# Patient Record
Sex: FEMALE | Race: WHITE | NOT HISPANIC OR LATINO | Employment: OTHER | URBAN - METROPOLITAN AREA
[De-identification: names, ages, dates, MRNs, and addresses within clinical notes are randomized per-mention and may not be internally consistent; named-entity substitution may affect disease eponyms.]

---

## 2017-01-04 ENCOUNTER — PATIENT MESSAGE (OUTPATIENT)
Dept: FAMILY MEDICINE | Facility: CLINIC | Age: 77
End: 2017-01-04

## 2017-02-01 ENCOUNTER — OFFICE VISIT (OUTPATIENT)
Dept: FAMILY MEDICINE | Facility: CLINIC | Age: 77
End: 2017-02-01
Payer: MEDICARE

## 2017-02-01 VITALS
OXYGEN SATURATION: 98 % | WEIGHT: 139.75 LBS | HEART RATE: 88 BPM | DIASTOLIC BLOOD PRESSURE: 66 MMHG | RESPIRATION RATE: 18 BRPM | TEMPERATURE: 98 F | SYSTOLIC BLOOD PRESSURE: 112 MMHG | HEIGHT: 62 IN | BODY MASS INDEX: 25.72 KG/M2

## 2017-02-01 DIAGNOSIS — S76.012A HIP STRAIN, LEFT, INITIAL ENCOUNTER: Primary | ICD-10-CM

## 2017-02-01 DIAGNOSIS — C56.9 OVARIAN CANCER, UNSPECIFIED LATERALITY: ICD-10-CM

## 2017-02-01 PROCEDURE — 99214 OFFICE O/P EST MOD 30 MIN: CPT | Mod: S$PBB,,, | Performed by: FAMILY MEDICINE

## 2017-02-01 PROCEDURE — 99999 PR PBB SHADOW E&M-EST. PATIENT-LVL III: CPT | Mod: PBBFAC,,, | Performed by: FAMILY MEDICINE

## 2017-02-01 PROCEDURE — 99213 OFFICE O/P EST LOW 20 MIN: CPT | Mod: PBBFAC,PO | Performed by: FAMILY MEDICINE

## 2017-02-01 RX ORDER — ATORVASTATIN CALCIUM 40 MG/1
TABLET, FILM COATED ORAL
COMMUNITY
Start: 2016-12-09 | End: 2017-02-01

## 2017-02-01 RX ORDER — SERTRALINE HYDROCHLORIDE 50 MG/1
TABLET, FILM COATED ORAL
COMMUNITY
Start: 2016-12-11 | End: 2017-06-15
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
## 2017-02-01 NOTE — MR AVS SNAPSHOT
[facility]  [address]  [place] [address]  Phone: [phone]  Fax: [phone]                  Leena Christina   2017 11:20 AM   Office Visit    Description:  Female : 1940   Provider:  Linda Leonard MD   Department:  [facility]           Reason for Visit     Hip Pain     Leg Pain           Diagnoses this Visit        Comments    Hip strain, left, initial encounter    -  Primary     Ovarian cancer, unspecified laterality                To Do List           Goals (5 Years of Data)     None      Follow-Up and Disposition     Return in about 2 weeks (around 2/15/2017) for If not better.      [facility] On Call     [facility] On Call Nurse Care Line -  Assistance  Registered nurses in the [facility] On Call Center provide clinical advisement, health education, appointment booking, and other advisory services.  Call for this free service at [phone].             Medications           Message regarding Medications     Verify the changes and/or additions to your medication regime listed below are the same as discussed with your clinician today.  If any of these changes or additions are incorrect, please notify your healthcare provider.        STOP taking these medications     atorvastatin (LIPITOR) 40 MG tablet     hydrocortisone-pramoxine (ANALPRAM-HC) 2.5-1 % Crea            Verify that the below list of medications is an accurate representation of the medications you are currently taking.  If none reported, the list may be blank. If incorrect, please contact your healthcare provider. Carry this list with you in case of emergency.           Current Medications     aspirin (ECOTRIN) 81 MG EC tablet Take 81 mg by mouth once daily.      B INFANTIS/B ANI/B YARELY/B BIFID (PROBIOTIC 4X ORAL) Take by mouth.    blood sugar diagnostic Strp TEST 1 TIMES DAILY    candesartan (ATACAND) 16 MG tablet Take 1 tablet (16 mg total) by mouth once daily.    celecoxib (CELEBREX)  "200 MG capsule TAKE 1 CAPSULE EVERY OTHER DAY    chlorhexidine (PERIDEX) 0.12 % solution RINSE WITH 15 MLS ONCE IN THE MORNING AND ONCE IN THE EVENING    ferrous sulfate (IRON) 325 mg (65 mg iron) Tab Take 325 mg by mouth daily with breakfast.      fish oil-omega-3 fatty acids 300-1,000 mg capsule Take 2 g by mouth once daily.      glucosamine-chondroitin 500-400 mg tablet Take 1 tablet by mouth 2 (two) times daily.      hydrochlorothiazide (HYDRODIURIL) 25 MG tablet TAKE 1 TABLET DAILY    L-METHYL-B6-B12 3-35-2 mg Tab TAKE 1 TABLET DAILY    metformin (GLUCOPHAGE) 500 MG tablet TAKE 1 TABLET TWICE A DAY WITH MEALS    NEXIUM 40 mg capsule TAKE 1 CAPSULE ONCE DAILY    ramipril (ALTACE) 5 MG capsule TAKE 1 CAPSULE DAILY    rosuvastatin (CRESTOR) 10 MG tablet Take 10 mg by mouth once daily.    sertraline (ZOLOFT) 100 MG tablet Take 150 mg by mouth once daily.     sertraline (ZOLOFT) 50 MG tablet     valsartan (DIOVAN) 160 MG tablet Take 1 tablet (160 mg total) by mouth once daily.    zolpidem (AMBIEN) 5 MG Tab 1-2 HS prn    FLUAD 0250-8577, 65 YR UP,,PF, 45 mcg (15 mcg x 3)/0.5 mL Syrg ADM 0.5ML IM UTD           Clinical Reference Information           Vital Signs - Last Recorded  Most recent update: 2/1/2017 11:27 AM by Tatiana Salazar MA    BP Pulse Temp Resp Ht Wt    112/66 (BP Location: Left arm, Patient Position: Sitting, BP Method: Manual) 88 97.8 °F (36.6 °C) (Oral) 18 5' 2" (1.575 m) 63.4 kg (139 lb 12.4 oz)    SpO2 BMI             98% 25.56 kg/m2         Blood Pressure          Most Recent Value    BP  112/66      Allergies as of 2/1/2017     Dilaudid [Hydromorphone]      Immunizations Administered on Date of Encounter - 2/1/2017     None      "

## 2017-02-16 ENCOUNTER — OFFICE VISIT (OUTPATIENT)
Dept: FAMILY MEDICINE | Facility: CLINIC | Age: 77
End: 2017-02-16
Payer: MEDICARE

## 2017-02-16 ENCOUNTER — HOSPITAL ENCOUNTER (OUTPATIENT)
Dept: RADIOLOGY | Facility: HOSPITAL | Age: 77
Discharge: HOME | End: 2017-02-16
Attending: NURSE PRACTITIONER
Payer: MEDICARE

## 2017-02-16 VITALS
SYSTOLIC BLOOD PRESSURE: 116 MMHG | HEART RATE: 96 BPM | DIASTOLIC BLOOD PRESSURE: 68 MMHG | BODY MASS INDEX: 24.84 KG/M2 | HEIGHT: 63 IN | WEIGHT: 140.19 LBS | OXYGEN SATURATION: 97 % | TEMPERATURE: 98 F

## 2017-02-16 DIAGNOSIS — M25.552 LEFT HIP PAIN: ICD-10-CM

## 2017-02-16 DIAGNOSIS — M25.552 LEFT HIP PAIN: Primary | ICD-10-CM

## 2017-02-16 PROCEDURE — 99214 OFFICE O/P EST MOD 30 MIN: CPT | Mod: S$PBB,,, | Performed by: NURSE PRACTITIONER

## 2017-02-16 PROCEDURE — 73502 X-RAY EXAM HIP UNI 2-3 VIEWS: CPT | Mod: TC,PO,LT

## 2017-02-16 PROCEDURE — 99999 PR PBB SHADOW E&M-EST. PATIENT-LVL V: CPT | Mod: PBBFAC,,, | Performed by: NURSE PRACTITIONER

## 2017-02-16 PROCEDURE — 73502 X-RAY EXAM HIP UNI 2-3 VIEWS: CPT | Mod: 26,LT,, | Performed by: RADIOLOGY

## 2017-02-16 RX ORDER — ATORVASTATIN CALCIUM 40 MG/1
40 TABLET, FILM COATED ORAL DAILY
COMMUNITY
End: 2017-03-17

## 2017-02-16 NOTE — MR AVS SNAPSHOT
[facility]  [address]  [place] [address]  Phone: [phone]  Fax: [phone]                  Leena Christina   2017 10:00 AM   Office Visit    Description:  Female : 1940   Provider:  Ed Griffin NP   Department:  [facility]           Reason for Visit     Hip Pain           Diagnoses this Visit        Comments    Left hip pain    -  Primary            To Do List           Goals (5 Years of Data)     None      Follow-Up and Disposition     Return in about 2 weeks (around 3/2/2017).      [facility] On Call     [facility] On Call Nurse Care Line -  Assistance  Registered nurses in the [facility] On Call Center provide clinical advisement, health education, appointment booking, and other advisory services.  Call for this free service at [phone].             Medications           Message regarding Medications     Verify the changes and/or additions to your medication regime listed below are the same as discussed with your clinician today.  If any of these changes or additions are incorrect, please notify your healthcare provider.        STOP taking these medications     rosuvastatin (CRESTOR) 10 MG tablet Take 10 mg by mouth once daily.    candesartan (ATACAND) 16 MG tablet Take 1 tablet (16 mg total) by mouth once daily.    chlorhexidine (PERIDEX) 0.12 % solution RINSE WITH 15 MLS ONCE IN THE MORNING AND ONCE IN THE EVENING    L-METHYL-B6-B12 3-35-2 mg Tab TAKE 1 TABLET DAILY           Verify that the below list of medications is an accurate representation of the medications you are currently taking.  If none reported, the list may be blank. If incorrect, please contact your healthcare provider. Carry this list with you in case of emergency.           Current Medications     aspirin (ECOTRIN) 81 MG EC tablet Take 81 mg by mouth once daily.      atorvastatin (LIPITOR) 40 MG tablet Take 40 mg by mouth once daily.    B INFANTIS/B ANI/B YARELY/B BIFID  "(PROBIOTIC 4X ORAL) Take by mouth.    blood sugar diagnostic Strp TEST 1 TIMES DAILY    celecoxib (CELEBREX) 200 MG capsule TAKE 1 CAPSULE EVERY OTHER DAY    ferrous sulfate (IRON) 325 mg (65 mg iron) Tab Take 325 mg by mouth daily with breakfast.      fish oil-omega-3 fatty acids 300-1,000 mg capsule Take 2 g by mouth once daily.      FLUAD 5643-4169, 65 YR UP,,PF, 45 mcg (15 mcg x 3)/0.5 mL Syrg ADM 0.5ML IM UTD    glucosamine-chondroitin 500-400 mg tablet Take 1 tablet by mouth 2 (two) times daily.      hydrochlorothiazide (HYDRODIURIL) 25 MG tablet TAKE 1 TABLET DAILY    metformin (GLUCOPHAGE) 500 MG tablet TAKE 1 TABLET TWICE A DAY WITH MEALS    NEXIUM 40 mg capsule TAKE 1 CAPSULE ONCE DAILY    ramipril (ALTACE) 5 MG capsule TAKE 1 CAPSULE DAILY    sertraline (ZOLOFT) 100 MG tablet Take 150 mg by mouth once daily.     sertraline (ZOLOFT) 50 MG tablet     valsartan (DIOVAN) 160 MG tablet Take 1 tablet (160 mg total) by mouth once daily.    zolpidem (AMBIEN) 5 MG Tab 1-2 HS prn           Clinical Reference Information           Your Vitals Were     BP Pulse Temp Height Weight SpO2    116/68 (BP Location: Right arm, Patient Position: Sitting, BP Method: Manual) 96 97.9 °F (36.6 °C) (Oral) 5' 3" (1.6 m) 63.6 kg (140 lb 3.4 oz) 97%    BMI                24.84 kg/m2          Blood Pressure          Most Recent Value    BP  116/68      Allergies as of 2/16/2017     Dilaudid [Hydromorphone]      Immunizations Administered on Date of Encounter - 2/16/2017     None      Orders Placed During Today's Visit      Normal Orders This Visit    Ambulatory Referral to Physical/Occupational Therapy     Future Labs/Procedures Expected by Expires    X-Ray Hip 2 or 3 views Right  2/16/2017 2/17/2018      Instructions    Celebrex daily for 2 weeks.        Language Assistance Services     ATTENTION: Language assistance services are available, free of charge. Please call [phone].      ATENCIÓN: Si nisreen onofre " disposición servicios gratuitos de asistencia lingüística. Radha al [phone].     DEVIN Ý: N?u b?n nói Ti?ng Vi?t, có các d?ch v? h? tr? ngôn ng? mi?n phí dành cho b?n. G?i s? [phone].         [facility] complies with applicable Federal civil rights laws and does not discriminate on the basis of race, color, national origin, age, disability, or sex.

## 2017-02-16 NOTE — PROGRESS NOTES
This dictation has been generated using Dragon Dictation some phonetic errors may occur.     Leena was seen today for hip pain.    Diagnoses and all orders for this visit:    Left hip pain  -     Ambulatory Referral to Physical/Occupational Therapy  -     Cancel: X-Ray Hip 2 or 3 views Right; Future  -     X-Ray Hip 2 or 3 views Left; Future      Left hip pain.  Suspect some arthritic issues.  Also consider tendinitis and bursitis.  Check x-ray as above and rule out avascular necrosis.      Return in about 2 weeks (around 3/2/2017).      ________________________________________________________________  ________________________________________________________________        Chief Complaint   Patient presents with    Hip Pain     History of present illness  This 76 y.o. presents today for complaint of left hip pain.  Patient saw one of my partners on February 1.  Symptoms started the day before.  Patient indicates she takes a fitness 101 program which is some sort of aerobics.  She indicates movement of the hip and the lateral and horizontal plane while standing.  She indicates that the kicking they have flared up the issue.  She did not mention this at last visit.  She denies a history of trauma.  Patient has also been doing water aerobics prior to onset of hip pain.  In the last 2 weeks she has not attended either class.  She indicates that it is a little better but not very much.  Patient states that left hip pain is worse in a.m.  When she gets up to go the bathroom at night she experiences pain and after sitting for periods of time she experiences pain with ambulation.  Patient does have pain with weightbearing.  Previously diagnosed with a hip sprain.  Patient denies any area of focal tenderness.  No redness or swelling in the affected joint.  Review of systems  Patient denies a rash  Patient denies new low back pain.  Hip pain does go down the leg beyond the knee.    Past medical and social history  reviewed.    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    Social History     Social History    Marital status:      Spouse name: N/A    Number of children: N/A    Years of education: N/A     Social History Main Topics    Smoking status: Never Smoker    Smokeless tobacco: None    Alcohol use None    Drug use: None    Sexual activity: Not Asked     Other Topics Concern    None     Social History Narrative       Current Outpatient Prescriptions   Medication Sig Dispense Refill    aspirin (ECOTRIN) 81 MG EC tablet Take 81 mg by mouth once daily.        atorvastatin (LIPITOR) 40 MG tablet Take 40 mg by mouth once daily.      B INFANTIS/B ANI/B YARELY/B BIFID (PROBIOTIC 4X ORAL) Take by mouth.      blood sugar diagnostic Strp TEST 1 TIMES DAILY 100 strip 12    celecoxib (CELEBREX) 200 MG capsule TAKE 1 CAPSULE EVERY OTHER DAY 45 capsule 4    ferrous sulfate (IRON) 325 mg (65 mg iron) Tab Take 325 mg by mouth daily with breakfast.        fish oil-omega-3 fatty acids 300-1,000 mg capsule Take 2 g by mouth once daily.        FLUAD 9972-0938, 65 YR UP,,PF, 45 mcg (15 mcg x 3)/0.5 mL Syrg ADM 0.5ML IM UTD  0    glucosamine-chondroitin 500-400 mg tablet Take 1 tablet by mouth 2 (two) times daily.        hydrochlorothiazide (HYDRODIURIL) 25 MG tablet TAKE 1 TABLET DAILY 90 tablet 1    metformin (GLUCOPHAGE) 500 MG tablet TAKE 1 TABLET TWICE A DAY WITH MEALS 180 tablet 1    NEXIUM 40 mg capsule TAKE 1 CAPSULE ONCE DAILY (Patient taking differently: i tab q other day) 90 capsule 11     ramipril (ALTACE) 5 MG capsule TAKE 1 CAPSULE DAILY 90 capsule 12    sertraline (ZOLOFT) 100 MG tablet Take 150 mg by mouth once daily.       sertraline (ZOLOFT) 50 MG tablet       valsartan (DIOVAN) 160 MG tablet Take 1 tablet (160 mg total) by mouth once daily. 90 tablet 12    zolpidem (AMBIEN) 5 MG Tab 1-2 HS prn 45 tablet 5     No current facility-administered medications for this visit.        Review of patient's allergies indicates:   Allergen Reactions    Dilaudid [hydromorphone] Other (See Comments)     hallucinations       Physical examination  Vitals Reviewed  Gen. Well-dressed well-nourished in mild distress due to left hip pain.  Skin warm dry and intact.  No rashes noted.  Chest.  Respirations are even unlabored.    Neuro. Awake alert oriented x4.  Normal judgment and cognition noted.  Extremities no clubbing cyanosis or edema noted.  Left hip pain with internal rotation.  No pain with external rotation.  No pain with cross leg examination or movement in the lateral plane.  Straight leg raise is negative.  No crepitus noted during range of motion.  Fairly normal spinal alignment with minimal heel discrepancy noted on the left.  No tenderness with palpation over the hip joint.  No evidence of trochanteric bursitis noted during palpation.  No localized warmth during palpation.  Inspection does not reveal rash.    Call or return to clinic prn if these symptoms worsen or fail to improve as anticipated.

## 2017-02-26 RX ORDER — RAMIPRIL 5 MG/1
CAPSULE ORAL
Qty: 90 CAPSULE | Refills: 11 | Status: SHIPPED | OUTPATIENT
Start: 2017-02-26 | End: 2018-03-13 | Stop reason: SDUPTHER

## 2017-02-26 RX ORDER — HYDROCHLOROTHIAZIDE 25 MG/1
TABLET ORAL
Qty: 90 TABLET | Refills: 0 | Status: SHIPPED | OUTPATIENT
Start: 2017-02-26 | End: 2017-05-26 | Stop reason: SDUPTHER

## 2017-03-02 ENCOUNTER — CLINICAL SUPPORT (OUTPATIENT)
Dept: REHABILITATION | Facility: HOSPITAL | Age: 77
End: 2017-03-02
Attending: NURSE PRACTITIONER
Payer: MEDICARE

## 2017-03-02 DIAGNOSIS — M25.552 LEFT HIP PAIN: ICD-10-CM

## 2017-03-02 DIAGNOSIS — M25.60 JOINT STIFFNESS: Primary | ICD-10-CM

## 2017-03-02 DIAGNOSIS — M62.81 MUSCLE WEAKNESS: ICD-10-CM

## 2017-03-02 DIAGNOSIS — R26.2 DIFFICULTY WALKING: ICD-10-CM

## 2017-03-02 PROCEDURE — G8978 MOBILITY CURRENT STATUS: HCPCS | Mod: CK,PN

## 2017-03-02 PROCEDURE — 97161 PT EVAL LOW COMPLEX 20 MIN: CPT | Mod: PN

## 2017-03-02 PROCEDURE — G8979 MOBILITY GOAL STATUS: HCPCS | Mod: CJ,PN

## 2017-03-02 PROCEDURE — 97110 THERAPEUTIC EXERCISES: CPT | Mod: PN
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
## 2017-03-03 RX ORDER — CELECOXIB 200 MG/1
CAPSULE ORAL
Qty: 45 CAPSULE | Refills: 4 | Status: SHIPPED | OUTPATIENT
Start: 2017-03-03 | End: 2017-03-09 | Stop reason: SDUPTHER

## 2017-03-06 ENCOUNTER — PATIENT MESSAGE (OUTPATIENT)
Dept: FAMILY MEDICINE | Facility: CLINIC | Age: 77
End: 2017-03-06

## 2017-03-07 RX ORDER — ATORVASTATIN CALCIUM 40 MG/1
TABLET, FILM COATED ORAL
Qty: 90 TABLET | Refills: 2 | Status: SHIPPED | OUTPATIENT
Start: 2017-03-07 | End: 2017-03-17

## 2017-03-09 ENCOUNTER — CLINICAL SUPPORT (OUTPATIENT)
Dept: REHABILITATION | Facility: HOSPITAL | Age: 77
End: 2017-03-09
Attending: NURSE PRACTITIONER
Payer: MEDICARE

## 2017-03-09 DIAGNOSIS — M25.552 LEFT HIP PAIN: ICD-10-CM

## 2017-03-09 DIAGNOSIS — M62.81 MUSCLE WEAKNESS: ICD-10-CM

## 2017-03-09 DIAGNOSIS — M25.60 JOINT STIFFNESS: Primary | ICD-10-CM

## 2017-03-09 DIAGNOSIS — R26.2 DIFFICULTY WALKING: ICD-10-CM

## 2017-03-09 PROCEDURE — 97110 THERAPEUTIC EXERCISES: CPT | Mod: PN

## 2017-03-09 RX ORDER — CELECOXIB 200 MG/1
CAPSULE ORAL
Qty: 45 CAPSULE | Refills: 4 | Status: SHIPPED | OUTPATIENT
Start: 2017-03-09 | End: 2017-03-17 | Stop reason: SDUPTHER

## 2017-03-09 NOTE — PROGRESS NOTES
Name: Leena Christina  Clinic Number: [id number]  Date of Treatment: 03/09/2017   Diagnosis:   Encounter Diagnoses   Name Primary?    Joint stiffness Yes    Muscle weakness     Difficulty walking     Left hip pain        Time in: 1:30  Time Out: 2:10    Total Treatment Time: 40 mintues        Subjective:    Leena reports improvement of symptoms.  Patient reports their pain to be 2/10 on a 0-10 scale with 0 being no pain and 10 being the worst pain imaginable. States she would like to shorten treatment today due to fatigue.     Objective      Treatment:   Pt received therapeutic exercises to develop strength, endurance, ROM, flexibility, posture and core stabilization for 35  minutes including:    -Nu Step x 6 min  -hip fall outs x 20  -piriformis stretch 20 sec x 4  -pelvic tilts x 20  -TrA recruitment x 5 sec 2 x 10  -ball squeeze x 2 x 10  -heel raises 3 x 10  -gastroc stretch 30 sec x 4      Pt received manual therapy to improve mobility for 5 minutes: np  -MET left innominate posterior      Pt was instructed in and given a home exercise program consisting of the above activities.       Assessment     No c/o increased discomfort with prescribed activities. Improved pelvic mobility. Pt demonstrates a good understanding of the education provided and a good return demonstration of activities. Pt  Requires skilled supervision to complete and progress home program.    Medical necessity is demonstrated by the following IMPAIRMENTS:  -pain   -decreased range of motion/flexibility   -decreased muscle strength   -impaired function    -decreased ADL ability  -decreased recreational ability     Patient is making good progress towards established goals.      Rehab Potential: good       Short Term Goals (4 Weeks):     1.Pt to increase strength by a 1/2 grade of muscles test to allow for improvement in functional activities such as performing chores.  2.Pt to improve range of motion by 25% to allow for improved functional  mobility to allow for improvement in IADLs.   3.Pt to report compliance with HEP and demonstrate proper exercise technique to PT to show competence with self management of condition.  4.Decrease pain by 25% during functional activities.    Long Term Goals (12 Weeks):     1. Increase ROM to allow improved joint biomechanics during functional activities.   2.Increase trunk and lower extremity strength to within normal limits during functional activities.   3. Independent with home exercise program.   4. Full return to functional activities with manageable complaints.  5. Patient to demonstrate improved posture and body mechanics.  6. Decrease pain by 75% during functional activities.    CMS Impairment/Limitation/Restriction for FOTO Hip Survey  Status Limitation G-Code CMS Severity Modifier  Intake 44% 56% Current Status CK - At least 40 percent but less than 60 percent  Predicted 56% 44% Goal Status+ CK - At least 40 percent but less than 60 percent  Goal: 20-40% impaired.     Plan     Continue with established Plan of Care towards PT goals.     Certification Period: 3/2/17 to 6/2/17    Recommended Treatment Plan: 2-3 times per week for 12 weeks with treatments to consist of:  Neuromuscular and postural re-education,  training, therapeutic exercise, therapeutic activities,balance training, manual therapy, soft tissue mobilization, ROM exercises, Cardiovascular, Postural stabilization, manual traction, spinal mobilization, moist heat, cryotherapy, electrical stimulation, ultrasound, home exercise education and planning.      Therapist: Brooks Jiang, PT

## 2017-03-15 ENCOUNTER — CLINICAL SUPPORT (OUTPATIENT)
Dept: REHABILITATION | Facility: HOSPITAL | Age: 77
End: 2017-03-15
Attending: NURSE PRACTITIONER
Payer: MEDICARE

## 2017-03-15 DIAGNOSIS — M25.60 JOINT STIFFNESS: Primary | ICD-10-CM

## 2017-03-15 DIAGNOSIS — R26.2 DIFFICULTY WALKING: ICD-10-CM

## 2017-03-15 DIAGNOSIS — M62.81 MUSCLE WEAKNESS: ICD-10-CM

## 2017-03-15 DIAGNOSIS — M25.552 LEFT HIP PAIN: ICD-10-CM

## 2017-03-15 PROCEDURE — 97110 THERAPEUTIC EXERCISES: CPT | Mod: PN

## 2017-03-15 NOTE — PROGRESS NOTES
Name: Leena Christina  Clinic Number: [id number]  Date of Treatment: 03/15/2017   Diagnosis:   Encounter Diagnoses   Name Primary?    Joint stiffness Yes    Muscle weakness     Difficulty walking     Left hip pain        Time in: 10:00  Time Out: 11:00    Total Treatment Time: 60 mintues (1 on 1 with PT for 45 minutes)        Subjective:    Pt reports decreased pain today. Reports fair compliance with home program    Objective      Treatment:   Pt received therapeutic exercises to develop strength, endurance, ROM, flexibility, posture and core stabilization for 50  minutes including:    -Nu Step x 8 min  -hip fall outs x 20  -piriformis stretch 20 sec x 4  -pelvic tilts x 20  -TrA recruitment x 5 sec 2 x 10  -TrA recruitment with hip flexion 2 x 10  -ball squeeze x 2 x 10  -supine hip abd with green t-band 3 x 10  -heel raises 3 x 10  -gastroc stretch 30 sec x 4  -Hip flexion isometric with t-ball 2 x 10      Pt received manual therapy to improve mobility for 5 minutes: np  -MET left innominate posterior      Pt was instructed in and given a home exercise program consisting of the above activities.       Assessment     No c/o increased discomfort with prescribed activities. Good response to exercise progression.  Pt demonstrates a good understanding of the education provided and a good return demonstration of activities. Pt  Requires skilled supervision to complete and progress home program.    Medical necessity is demonstrated by the following IMPAIRMENTS:  -pain   -decreased range of motion/flexibility   -decreased muscle strength   -impaired function    -decreased ADL ability  -decreased recreational ability     Patient is making good progress towards established goals.      Rehab Potential: good       Short Term Goals (4 Weeks):     1.Pt to increase strength by a 1/2 grade of muscles test to allow for improvement in functional activities such as performing chores.  2.Pt to improve range of motion by 25% to  allow for improved functional mobility to allow for improvement in IADLs.   3.Pt to report compliance with HEP and demonstrate proper exercise technique to PT to show competence with self management of condition.  4.Decrease pain by 25% during functional activities.    Long Term Goals (12 Weeks):     1. Increase ROM to allow improved joint biomechanics during functional activities.   2.Increase trunk and lower extremity strength to within normal limits during functional activities.   3. Independent with home exercise program.   4. Full return to functional activities with manageable complaints.  5. Patient to demonstrate improved posture and body mechanics.  6. Decrease pain by 75% during functional activities.    CMS Impairment/Limitation/Restriction for FOTO Hip Survey  Status Limitation G-Code CMS Severity Modifier  Intake 44% 56% Current Status CK - At least 40 percent but less than 60 percent  Predicted 56% 44% Goal Status+ CK - At least 40 percent but less than 60 percent  Goal: 20-40% impaired.     Plan     Continue with established Plan of Care towards PT goals.     Certification Period: 3/2/17 to 6/2/17    Recommended Treatment Plan: 2-3 times per week for 12 weeks with treatments to consist of:  Neuromuscular and postural re-education,  training, therapeutic exercise, therapeutic activities,balance training, manual therapy, soft tissue mobilization, ROM exercises, Cardiovascular, Postural stabilization, manual traction, spinal mobilization, moist heat, cryotherapy, electrical stimulation, ultrasound, home exercise education and planning.      Therapist: Brooks Jiang, PT

## 2017-03-17 ENCOUNTER — CLINICAL SUPPORT (OUTPATIENT)
Dept: OPHTHALMOLOGY | Facility: CLINIC | Age: 77
End: 2017-03-17
Attending: NURSE PRACTITIONER
Payer: MEDICARE

## 2017-03-17 ENCOUNTER — OFFICE VISIT (OUTPATIENT)
Dept: FAMILY MEDICINE | Facility: CLINIC | Age: 77
End: 2017-03-17
Payer: MEDICARE

## 2017-03-17 VITALS
SYSTOLIC BLOOD PRESSURE: 128 MMHG | OXYGEN SATURATION: 95 % | HEIGHT: 63 IN | BODY MASS INDEX: 24.42 KG/M2 | WEIGHT: 137.81 LBS | DIASTOLIC BLOOD PRESSURE: 70 MMHG | TEMPERATURE: 98 F | HEART RATE: 93 BPM

## 2017-03-17 DIAGNOSIS — M25.552 LEFT HIP PAIN: ICD-10-CM

## 2017-03-17 DIAGNOSIS — E11.9 TYPE 2 DIABETES MELLITUS WITHOUT COMPLICATION, WITHOUT LONG-TERM CURRENT USE OF INSULIN: ICD-10-CM

## 2017-03-17 DIAGNOSIS — E11.9 TYPE 2 DIABETES MELLITUS WITHOUT COMPLICATION, WITHOUT LONG-TERM CURRENT USE OF INSULIN: Primary | ICD-10-CM

## 2017-03-17 PROCEDURE — 99214 OFFICE O/P EST MOD 30 MIN: CPT | Mod: S$PBB,,, | Performed by: NURSE PRACTITIONER

## 2017-03-17 PROCEDURE — 99999 PR PBB SHADOW E&M-EST. PATIENT-LVL IV: CPT | Mod: PBBFAC,,, | Performed by: NURSE PRACTITIONER

## 2017-03-17 PROCEDURE — 99999 PR PBB SHADOW E&M-EST. PATIENT-LVL II: CPT | Mod: PBBFAC,,,

## 2017-03-17 PROCEDURE — 99212 OFFICE O/P EST SF 10 MIN: CPT | Mod: PBBFAC,27,PO

## 2017-03-17 PROCEDURE — 92227 IMG RTA DETCJ/MNTR DS STAFF: CPT | Mod: 50,PBBFAC,PO

## 2017-03-17 RX ORDER — ROSUVASTATIN CALCIUM 10 MG/1
10 TABLET, COATED ORAL DAILY
COMMUNITY

## 2017-03-17 RX ORDER — CELECOXIB 200 MG/1
CAPSULE ORAL
Qty: 90 CAPSULE | Refills: 3 | Status: SHIPPED | OUTPATIENT
Start: 2017-03-17 | End: 2017-04-05 | Stop reason: SDUPTHER

## 2017-03-17 NOTE — PROGRESS NOTES
This dictation has been generated using Dragon Dictation some phonetic errors may occur.     Leena was seen today for leg pain and hip pain.    Diagnoses and all orders for this visit:    Type 2 diabetes mellitus without complication, without long-term current use of insulin  -     Hemoglobin A1c; Future  -     Basic metabolic panel; Future  -     Diabetic Eye Screening Photo; Future    Left hip pain    Other orders  -     celecoxib (CELEBREX) 200 MG capsule; TAKE 1 CAPSULE EVERY OTHER DAY      Diabetes. Due for lab.  I'll review and address accordingly.  left hip pain improved.  Continue with physical therapy.  Recommended resuming aquatic exercises.  May try other exercises after discussing with physical therapy.    Refill Celebrex    Return in about 5 months (around 8/17/2017).      ________________________________________________________________  ________________________________________________________________        Chief Complaint   Patient presents with    Leg Pain    Hip Pain     History of present illness  This 76 y.o. presents today for complaint of left hip pain.  Patient has been going to physical therapy once a week.  I saw this patient recently for this complaint.  She notes improvement since our last visit.  She is interested in resuming water aerobics.  We discussed resuming that for 2 weeks before going to [employer].  She also needs to discuss this with physical therapy.  Patient has a diabetes and renal insufficiency.  She is due for update of labs.  He has been more than 6 months.  Denies polyuria polydipsia.  Taking meds as directed.  No side effects to meds.  Had been active however due to hip issue has been less active since January.  Tries to watch her diet but does note some dietary indiscretion in regards to diabetic restrictions.  Review of systems  No fever or chills.  No malaise or body aches.  No unintended weight change.  No polyuria polydipsia  No chest pain or shortness of  breath  No nausea vomiting or diarrhea.  Appetite is fair.  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 No pertinent family history.    Social History     Social History    Marital status:      Spouse name: N/A    Number of children: N/A    Years of education: N/A     Social History Main Topics    Smoking status: Never Smoker    Smokeless tobacco: None    Alcohol use None    Drug use: None    Sexual activity: Not Asked     Other Topics Concern    None     Social History Narrative       Current Outpatient Prescriptions   Medication Sig Dispense Refill    aspirin (ECOTRIN) 81 MG EC tablet Take 81 mg by mouth once daily.        B INFANTIS/B ANI/B YARELY/B BIFID (PROBIOTIC 4X ORAL) Take by mouth.      blood sugar diagnostic Strp TEST 1 TIMES DAILY 100 strip 12    celecoxib (CELEBREX) 200 MG capsule TAKE 1 CAPSULE EVERY OTHER DAY 90 capsule 3    fish oil-omega-3 fatty acids 300-1,000 mg capsule Take 2 g by mouth once daily.        glucosamine-chondroitin 500-400 mg tablet Take 1 tablet by mouth 2 (two) times daily.        hydrochlorothiazide (HYDRODIURIL) 25 MG tablet TAKE 1 TABLET DAILY 90 tablet 0    metformin (GLUCOPHAGE) 500 MG tablet TAKE 1 TABLET TWICE A DAY WITH MEALS 180 tablet 1    NEXIUM 40 mg capsule TAKE 1 CAPSULE ONCE DAILY (Patient taking differently: i tab q other day) 90 capsule 11    ramipril (ALTACE) 5 MG capsule TAKE 1 CAPSULE DAILY 90 capsule 11    rosuvastatin (CRESTOR) 10 MG tablet Take 10 mg by mouth once  daily.      sertraline (ZOLOFT) 100 MG tablet Take 150 mg by mouth once daily.       sertraline (ZOLOFT) 50 MG tablet       valsartan (DIOVAN) 160 MG tablet Take 1 tablet (160 mg total) by mouth once daily. 90 tablet 12    zolpidem (AMBIEN) 5 MG Tab 1-2 HS prn 45 tablet 5     No current facility-administered medications for this visit.        Review of patient's allergies indicates:   Allergen Reactions    Dilaudid [hydromorphone] Other (See Comments)     hallucinations       Physical examination  Vitals Reviewed  Gen. Well-dressed well-nourished no apparent distress  Skin warm dry and intact.  No rashes noted.  Neck is supple without adenopathy  Chest.  Respirations are even unlabored.  Lungs are clear to auscultation.  Cardiac regular rate and rhythm.  No chest wall adenopathy noted.  Neuro. Awake alert oriented x4.  Normal judgment and cognition noted.  Extremities no clubbing cyanosis or edema noted.  Ambulating without assistance.  Gets on the exam table and off the exam table without assistance.  No tenderness lateral hip.    Call or return to clinic prn if these symptoms worsen or fail to improve as anticipated.

## 2017-03-17 NOTE — MR AVS SNAPSHOT
[facility]  [address]  [place] [address]  Phone: [phone]  Fax: [phone]                  Leena Christina   3/17/2017 10:00 AM   Office Visit    Description:  Female : 1940   Provider:  Ed Griffin NP   Department:  [facility]           Reason for Visit     Leg Pain     Hip Pain           Diagnoses this Visit        Comments    Type 2 diabetes mellitus without complication, without long-term current use of insulin    -  Primary     Left hip pain                To Do List           Future Appointments        Provider Department Dept Phone    3/24/2017 12:30 PM Brooks Jiang, PT [facility] [phone]      Goals (5 Years of Data)     None      Follow-Up and Disposition     Return in about 5 months (around 2017).      [facility]     [facility] Nurse Care Line -  Assistance  Registered nurses in the [facility] provide clinical advisement, health education, appointment booking, and other advisory services.  Call for this free service at [phone].             Medications           Message regarding Medications     Verify the changes and/or additions to your medication regime listed below are the same as discussed with your clinician today.  If any of these changes or additions are incorrect, please notify your healthcare provider.        STOP taking these medications     ferrous sulfate (IRON) 325 mg (65 mg iron) Tab Take 325 mg by mouth daily with breakfast.      FLUAD [id number], 65 YR UP,,PF, 45 mcg (15 mcg x 3)/0.5 mL Syrg ADM 0.5ML IM UTD    atorvastatin (LIPITOR) 40 MG tablet Take 40 mg by mouth once daily.    atorvastatin (LIPITOR) 40 MG tablet TAKE 1 TABLET DAILY           Verify that the below list of medications is an accurate representation of the medications you are currently taking.  If none reported, the list may be blank. If incorrect, please contact your healthcare  "provider. Carry this list with you in case of emergency.           Current Medications     aspirin (ECOTRIN) 81 MG EC tablet Take 81 mg by mouth once daily.      B INFANTIS/B ANI/B YARELY/B BIFID (PROBIOTIC 4X ORAL) Take by mouth.    blood sugar diagnostic Strp TEST 1 TIMES DAILY    celecoxib (CELEBREX) 200 MG capsule TAKE 1 CAPSULE EVERY OTHER DAY    fish oil-omega-3 fatty acids 300-1,000 mg capsule Take 2 g by mouth once daily.      glucosamine-chondroitin 500-400 mg tablet Take 1 tablet by mouth 2 (two) times daily.      hydrochlorothiazide (HYDRODIURIL) 25 MG tablet TAKE 1 TABLET DAILY    metformin (GLUCOPHAGE) 500 MG tablet TAKE 1 TABLET TWICE A DAY WITH MEALS    NEXIUM 40 mg capsule TAKE 1 CAPSULE ONCE DAILY    ramipril (ALTACE) 5 MG capsule TAKE 1 CAPSULE DAILY    rosuvastatin (CRESTOR) 10 MG tablet Take 10 mg by mouth once daily.    sertraline (ZOLOFT) 100 MG tablet Take 150 mg by mouth once daily.     sertraline (ZOLOFT) 50 MG tablet     valsartan (DIOVAN) 160 MG tablet Take 1 tablet (160 mg total) by mouth once daily.    zolpidem (AMBIEN) 5 MG Tab 1-2 HS prn           Clinical Reference Information           Your Vitals Were     BP Pulse Temp Height Weight SpO2    128/70 (BP Location: Right arm, Patient Position: Sitting, BP Method: Manual) 93 97.7 °F (36.5 °C) (Oral) 5' 3" (1.6 m) 62.5 kg (137 lb 12.6 oz) 95%    BMI                24.41 kg/m2          Blood Pressure          Most Recent Value    BP  128/70      Allergies as of 3/17/2017     Dilaudid [Hydromorphone]      Immunizations Administered on Date of Encounter - 3/17/2017     None      Orders Placed During Today's Visit     Future Labs/Procedures Expected by Expires    Basic metabolic panel  3/17/2017 3/17/2018    Diabetic Eye Screening Photo  As directed 3/17/2018    Hemoglobin A1c  As directed 3/17/2018      Instructions    Resume water aerobics. Go also on Friday or Saturday and exercise lower body in the pool.        Language Assistance Services  "    ATTENTION: Language assistance services are available, free of charge. Please call [phone].      ATENCIÓN: Si habla jeanne, tiene a hensley disposición servicios gratuitos de asistencia lingüística. Llame al [phone].     CHÚ Ý: N?u b?n nói Ti?ng Vi?t, có các d?ch v? h? tr? ngôn ng? mi?n phí dành cho b?n. G?i s? [phone].         [facility] complies with applicable Federal civil rights laws and does not discriminate on the basis of race, color, national origin, age, disability, or sex.

## 2017-03-17 NOTE — PROGRESS NOTES
HPI     76 year old here for screening for diabetic retinopathy with non dilated   fundus photo per dr. hart       Last edited by Marina Hicks MA on 3/17/2017  2:43 PM.         Assessment /Plan     For exam results, see Encounter Report.    Type 2 diabetes mellitus without complication, without long-term current use of insulin  -     Diabetic Eye Screening Photo      76 year old here for screening for diabetic retinopathy with non dilated fundus photo please see dr. lindsey for interpretation

## 2017-03-20 ENCOUNTER — TELEPHONE (OUTPATIENT)
Dept: OPHTHALMOLOGY | Facility: CLINIC | Age: 77
End: 2017-03-20

## 2017-03-31 ENCOUNTER — CLINICAL SUPPORT (OUTPATIENT)
Dept: REHABILITATION | Facility: HOSPITAL | Age: 77
End: 2017-03-31
Attending: NURSE PRACTITIONER
Payer: MEDICARE

## 2017-03-31 DIAGNOSIS — M62.81 MUSCLE WEAKNESS: ICD-10-CM

## 2017-03-31 DIAGNOSIS — R26.2 DIFFICULTY WALKING: ICD-10-CM

## 2017-03-31 DIAGNOSIS — M25.60 JOINT STIFFNESS: Primary | ICD-10-CM

## 2017-03-31 DIAGNOSIS — M25.552 LEFT HIP PAIN: ICD-10-CM

## 2017-03-31 PROCEDURE — 97110 THERAPEUTIC EXERCISES: CPT | Mod: PN

## 2017-03-31 NOTE — PROGRESS NOTES
Name: Leena Christina  Clinic Number: [id number]  Date of Treatment: 03/31/2017   Diagnosis:   Encounter Diagnoses   Name Primary?    Joint stiffness Yes    Muscle weakness     Difficulty walking     Left hip pain        Time in: 10:00  Time Out: 11:00    Total Treatment Time: 60 mintues (1 on 1 with PT for 45 minutes)        Subjective:    Pt states therapy continues to help. Reports fair compliance with home stretching.     Objective      Treatment:   Pt received therapeutic exercises to develop strength, endurance, ROM, flexibility, posture and core stabilization for 50  minutes including:    -Nu Step x 8 min  -hip fall outs x 20  -LTR x 20  -piriformis stretch 20 sec x 4  -pelvic tilts x 20  -TrA recruitment x 5 sec 2 x 10  -TrA recruitment with hip flexion 2 x 10  -ball squeeze x 2 x 10  -supine hip abd with green t-band 3 x 10  -heel raises 3 x 10  -gastroc stretch 30 sec x 4  -Hip flexion isometric with t-ball 2 x 10      Pt received manual therapy to improve mobility for 5 minutes: np  -MET left innominate posterior      Pt was instructed in and given a home exercise program consisting of the above activities.       Assessment     No c/o increased discomfort with prescribed activities. Good response to exercise progression of lumbar and hip ROM and stabilization emphasis.  Pt demonstrates a good understanding of the education provided and a good return demonstration of activities. Pt  Requires skilled supervision to complete and progress home program.    Medical necessity is demonstrated by the following IMPAIRMENTS:  -pain   -decreased range of motion/flexibility   -decreased muscle strength   -impaired function    -decreased ADL ability  -decreased recreational ability     Patient is making good progress towards established goals.      Rehab Potential: good       Short Term Goals (4 Weeks):     1.Pt to increase strength by a 1/2 grade of muscles test to allow for improvement in functional activities  such as performing chores.  2.Pt to improve range of motion by 25% to allow for improved functional mobility to allow for improvement in IADLs.   3.Pt to report compliance with HEP and demonstrate proper exercise technique to PT to show competence with self management of condition.  4.Decrease pain by 25% during functional activities.    Long Term Goals (12 Weeks):     1. Increase ROM to allow improved joint biomechanics during functional activities.   2.Increase trunk and lower extremity strength to within normal limits during functional activities.   3. Independent with home exercise program.   4. Full return to functional activities with manageable complaints.  5. Patient to demonstrate improved posture and body mechanics.  6. Decrease pain by 75% during functional activities.    CMS Impairment/Limitation/Restriction for FOTO Hip Survey  Status Limitation G-Code CMS Severity Modifier  Intake 44% 56%  Predicted 56% 44% Goal Status+ CK - At least 40 percent but less than 60 percent  3/31/2017 65% 35% Current Status CJ - At least 20 percent but less than 40 percent    Plan     Continue with established Plan of Care towards PT goals.     Certification Period: 3/2/17 to 6/2/17    Recommended Treatment Plan: 2-3 times per week for 12 weeks with treatments to consist of:  Neuromuscular and postural re-education,  training, therapeutic exercise, therapeutic activities,balance training, manual therapy, soft tissue mobilization, ROM exercises, Cardiovascular, Postural stabilization, manual traction, spinal mobilization, moist heat, cryotherapy, electrical stimulation, ultrasound, home exercise education and planning.      Therapist: Brooks Jiang, PT

## 2017-04-05 RX ORDER — CELECOXIB 200 MG/1
CAPSULE ORAL
Qty: 90 CAPSULE | Refills: 3 | Status: SHIPPED | OUTPATIENT
Start: 2017-04-05 | End: 2018-05-02 | Stop reason: SDUPTHER

## 2017-04-12 RX ORDER — ZOLPIDEM TARTRATE 5 MG/1
TABLET ORAL
Qty: 45 TABLET | Refills: 5 | Status: SHIPPED | OUTPATIENT
Start: 2017-04-12 | End: 2017-10-31 | Stop reason: SDUPTHER

## 2017-04-13 RX ORDER — HYDROCORTISONE 25 MG/G
CREAM TOPICAL 3 TIMES DAILY
Qty: 30 G | Refills: 1 | Status: SHIPPED | OUTPATIENT
Start: 2017-04-13 | End: 2017-04-23

## 2017-05-08 RX ORDER — METFORMIN HYDROCHLORIDE 500 MG/1
TABLET ORAL
Qty: 180 TABLET | Refills: 2 | Status: SHIPPED | OUTPATIENT
Start: 2017-05-08 | End: 2018-02-02 | Stop reason: SDUPTHER

## 2017-05-26 RX ORDER — HYDROCHLOROTHIAZIDE 25 MG/1
TABLET ORAL
Qty: 90 TABLET | Refills: 0 | Status: SHIPPED | OUTPATIENT
Start: 2017-05-26 | End: 2017-10-26 | Stop reason: SDUPTHER

## 2017-06-15 ENCOUNTER — OFFICE VISIT (OUTPATIENT)
Dept: FAMILY MEDICINE | Facility: CLINIC | Age: 77
End: 2017-06-15
Payer: MEDICARE

## 2017-06-15 ENCOUNTER — HOSPITAL ENCOUNTER (OUTPATIENT)
Dept: RADIOLOGY | Facility: HOSPITAL | Age: 77
Discharge: HOME | End: 2017-06-15
Attending: INTERNAL MEDICINE
Payer: MEDICARE

## 2017-06-15 VITALS
TEMPERATURE: 98 F | BODY MASS INDEX: 24.59 KG/M2 | OXYGEN SATURATION: 97 % | DIASTOLIC BLOOD PRESSURE: 70 MMHG | SYSTOLIC BLOOD PRESSURE: 120 MMHG | HEIGHT: 62 IN | WEIGHT: 133.63 LBS | HEART RATE: 88 BPM

## 2017-06-15 DIAGNOSIS — R06.02 SOB (SHORTNESS OF BREATH): ICD-10-CM

## 2017-06-15 DIAGNOSIS — F32.A DEPRESSION, UNSPECIFIED DEPRESSION TYPE: Primary | ICD-10-CM

## 2017-06-15 DIAGNOSIS — C56.9 OVARIAN CANCER, UNSPECIFIED LATERALITY: ICD-10-CM

## 2017-06-15 DIAGNOSIS — I10 ESSENTIAL HYPERTENSION: ICD-10-CM

## 2017-06-15 PROCEDURE — 71020 XR CHEST PA AND LATERAL: CPT | Mod: TC,PO

## 2017-06-15 PROCEDURE — 1159F MED LIST DOCD IN RCRD: CPT | Mod: ,,, | Performed by: INTERNAL MEDICINE

## 2017-06-15 PROCEDURE — 71020 XR CHEST PA AND LATERAL: CPT | Mod: 26,,, | Performed by: RADIOLOGY

## 2017-06-15 PROCEDURE — 99999 PR PBB SHADOW E&M-EST. PATIENT-LVL III: CPT | Mod: PBBFAC,,, | Performed by: INTERNAL MEDICINE

## 2017-06-15 PROCEDURE — 99215 OFFICE O/P EST HI 40 MIN: CPT | Mod: S$PBB,,, | Performed by: INTERNAL MEDICINE

## 2017-06-15 PROCEDURE — 1126F AMNT PAIN NOTED NONE PRSNT: CPT | Mod: ,,, | Performed by: INTERNAL MEDICINE

## 2017-06-15 RX ORDER — SERTRALINE HYDROCHLORIDE 100 MG/1
200 TABLET, FILM COATED ORAL DAILY
Qty: 180 TABLET | Refills: 12 | Status: SHIPPED | OUTPATIENT
Start: 2017-06-15 | End: 2018-08-07 | Stop reason: SDUPTHER

## 2017-06-15 RX ORDER — ATORVASTATIN CALCIUM 40 MG/1
TABLET, FILM COATED ORAL
COMMUNITY
Start: 2017-06-05 | End: 2017-09-25 | Stop reason: SDUPTHER
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
## 2017-08-29 ENCOUNTER — OFFICE VISIT (OUTPATIENT)
Dept: FAMILY MEDICINE | Facility: CLINIC | Age: 77
End: 2017-08-29
Payer: MEDICARE

## 2017-08-29 VITALS
TEMPERATURE: 98 F | BODY MASS INDEX: 23.74 KG/M2 | DIASTOLIC BLOOD PRESSURE: 60 MMHG | HEIGHT: 62 IN | SYSTOLIC BLOOD PRESSURE: 110 MMHG | HEART RATE: 89 BPM | WEIGHT: 129 LBS | OXYGEN SATURATION: 96 %

## 2017-08-29 DIAGNOSIS — R16.1 SPLENOMEGALY: ICD-10-CM

## 2017-08-29 DIAGNOSIS — R10.30 LOWER ABDOMINAL PAIN: ICD-10-CM

## 2017-08-29 DIAGNOSIS — C56.9 OVARIAN CANCER, UNSPECIFIED LATERALITY: Primary | ICD-10-CM

## 2017-08-29 PROCEDURE — 1125F AMNT PAIN NOTED PAIN PRSNT: CPT | Mod: ,,, | Performed by: INTERNAL MEDICINE

## 2017-08-29 PROCEDURE — 99215 OFFICE O/P EST HI 40 MIN: CPT | Mod: S$PBB,,, | Performed by: INTERNAL MEDICINE

## 2017-08-29 PROCEDURE — 99213 OFFICE O/P EST LOW 20 MIN: CPT | Mod: PBBFAC,PO | Performed by: INTERNAL MEDICINE

## 2017-08-29 PROCEDURE — 3078F DIAST BP <80 MM HG: CPT | Mod: ,,, | Performed by: INTERNAL MEDICINE

## 2017-08-29 PROCEDURE — 1159F MED LIST DOCD IN RCRD: CPT | Mod: ,,, | Performed by: INTERNAL MEDICINE

## 2017-08-29 PROCEDURE — 3074F SYST BP LT 130 MM HG: CPT | Mod: ,,, | Performed by: INTERNAL MEDICINE

## 2017-08-29 PROCEDURE — 99999 PR PBB SHADOW E&M-EST. PATIENT-LVL III: CPT | Mod: PBBFAC,,, | Performed by: INTERNAL MEDICINE

## 2017-08-29 RX ORDER — NIRAPARIB 100 MG/1
200 CAPSULE ORAL NIGHTLY
COMMUNITY
Start: 2017-08-24

## 2017-08-29 RX ORDER — OXYCODONE AND ACETAMINOPHEN 5; 325 MG/1; MG/1
TABLET ORAL
COMMUNITY
Start: 2017-08-03 | End: 2018-06-08 | Stop reason: ALTCHOICE
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
## 2017-09-13 RX ORDER — ESOMEPRAZOLE MAGNESIUM 40 MG/1
CAPSULE, DELAYED RELEASE ORAL
Qty: 90 CAPSULE | Refills: 11 | Status: SHIPPED | OUTPATIENT
Start: 2017-09-13 | End: 2019-05-29 | Stop reason: SDUPTHER

## 2017-09-25 DIAGNOSIS — E78.5 HYPERLIPIDEMIA, UNSPECIFIED HYPERLIPIDEMIA TYPE: Primary | ICD-10-CM

## 2017-09-25 NOTE — TELEPHONE ENCOUNTER
LOV 8/29/17    ----- Message from Aislinn Amor sent at 9/25/2017 10:52 AM CDT -----  Contact: Self   Patient need a refill. Please call patient at [phone].    atorvastatin (LIPITOR) 40 MG tablet      [facility]

## 2017-09-27 RX ORDER — ATORVASTATIN CALCIUM 40 MG/1
40 TABLET, FILM COATED ORAL DAILY
Qty: 90 TABLET | Refills: 90 | Status: SHIPPED | OUTPATIENT
Start: 2017-09-27 | End: 2019-02-03 | Stop reason: SDUPTHER

## 2017-10-26 ENCOUNTER — PATIENT MESSAGE (OUTPATIENT)
Dept: FAMILY MEDICINE | Facility: CLINIC | Age: 77
End: 2017-10-26

## 2017-10-26 ENCOUNTER — TELEPHONE (OUTPATIENT)
Dept: FAMILY MEDICINE | Facility: CLINIC | Age: 77
End: 2017-10-26

## 2017-10-26 DIAGNOSIS — I10 ESSENTIAL HYPERTENSION: Primary | ICD-10-CM

## 2017-10-26 RX ORDER — HYDROCHLOROTHIAZIDE 25 MG/1
25 TABLET ORAL DAILY
Qty: 90 TABLET | Refills: 3 | Status: SHIPPED | OUTPATIENT
Start: 2017-10-26 | End: 2018-10-21 | Stop reason: SDUPTHER

## 2017-10-26 NOTE — TELEPHONE ENCOUNTER
----- Message from Jesús Maurice sent at 10/25/2017  1:45 PM CDT -----  Contact: PT /[phone]  Calling to speak with nurse regarding letter [facility] keep sending,in reference to protocol of scripts with doctor's office sending. PT states she needs autho

## 2017-10-28 ENCOUNTER — TELEPHONE (OUTPATIENT)
Dept: FAMILY MEDICINE | Facility: CLINIC | Age: 77
End: 2017-10-28

## 2017-10-28 DIAGNOSIS — Z12.31 ENCOUNTER FOR SCREENING MAMMOGRAM FOR BREAST CANCER: Primary | ICD-10-CM

## 2017-10-28 NOTE — TELEPHONE ENCOUNTER
----- Message from [employer] sent at 10/27/2017  2:17 PM CDT -----  Contact: self  Pt requested mammogram orders. Pt also stated that she received papers from [employer] to have blood work done. Pt can be reached at [phone] or [phone].

## 2017-10-31 RX ORDER — ZOLPIDEM TARTRATE 5 MG/1
TABLET ORAL
Qty: 45 TABLET | Refills: 5 | Status: SHIPPED | OUTPATIENT
Start: 2017-10-31

## 2017-11-01 NOTE — TELEPHONE ENCOUNTER
Call -- Dr FAM adjusted the health maint to reflect that you only need the diabetes check once a year and the last one this year was perfect.    Disregard any care touch you get in the future

## 2017-11-03 ENCOUNTER — PATIENT MESSAGE (OUTPATIENT)
Dept: FAMILY MEDICINE | Facility: CLINIC | Age: 77
End: 2017-11-03

## 2017-11-03 NOTE — TELEPHONE ENCOUNTER
Lipid profile not that important, excellent in the past, not that important to have it done every year just because.  We will add it to the next blood work when needed

## 2017-11-07 ENCOUNTER — HOSPITAL ENCOUNTER (OUTPATIENT)
Dept: RADIOLOGY | Facility: HOSPITAL | Age: 77
Discharge: HOME | End: 2017-11-07
Attending: INTERNAL MEDICINE
Payer: MEDICARE

## 2017-11-07 VITALS — BODY MASS INDEX: 23.55 KG/M2 | WEIGHT: 128 LBS | HEIGHT: 62 IN

## 2017-11-07 DIAGNOSIS — Z12.31 ENCOUNTER FOR SCREENING MAMMOGRAM FOR BREAST CANCER: ICD-10-CM

## 2017-11-07 PROCEDURE — 77063 BREAST TOMOSYNTHESIS BI: CPT | Mod: 26,,, | Performed by: RADIOLOGY

## 2017-11-07 PROCEDURE — 77067 SCR MAMMO BI INCL CAD: CPT | Mod: 26,,, | Performed by: RADIOLOGY

## 2017-11-07 PROCEDURE — 77067 SCR MAMMO BI INCL CAD: CPT | Mod: TC

## 2018-02-02 RX ORDER — METFORMIN HYDROCHLORIDE 500 MG/1
TABLET ORAL
Qty: 180 TABLET | Refills: 2 | Status: SHIPPED | OUTPATIENT
Start: 2018-02-02 | End: 2019-04-25 | Stop reason: ALTCHOICE

## 2018-02-06 ENCOUNTER — PATIENT MESSAGE (OUTPATIENT)
Dept: FAMILY MEDICINE | Facility: CLINIC | Age: 78
End: 2018-02-06

## 2018-02-07 RX ORDER — VALSARTAN 160 MG/1
160 TABLET ORAL DAILY
Qty: 90 TABLET | Refills: 12 | Status: SHIPPED | OUTPATIENT
Start: 2018-02-07 | End: 2018-09-06

## 2018-03-13 RX ORDER — RAMIPRIL 5 MG/1
CAPSULE ORAL
Qty: 90 CAPSULE | Refills: 0 | Status: SHIPPED | OUTPATIENT
Start: 2018-03-13 | End: 2018-07-30 | Stop reason: SDUPTHER

## 2018-03-29 ENCOUNTER — PATIENT MESSAGE (OUTPATIENT)
Dept: FAMILY MEDICINE | Facility: CLINIC | Age: 78
End: 2018-03-29

## 2018-04-04 ENCOUNTER — OFFICE VISIT (OUTPATIENT)
Dept: URGENT CARE | Facility: CLINIC | Age: 78
End: 2018-04-04
Payer: MEDICARE

## 2018-04-04 VITALS
DIASTOLIC BLOOD PRESSURE: 54 MMHG | TEMPERATURE: 99 F | WEIGHT: 130 LBS | OXYGEN SATURATION: 97 % | HEIGHT: 63 IN | SYSTOLIC BLOOD PRESSURE: 93 MMHG | HEART RATE: 101 BPM | BODY MASS INDEX: 23.04 KG/M2 | RESPIRATION RATE: 18 BRPM

## 2018-04-04 DIAGNOSIS — N30.00 ACUTE CYSTITIS WITHOUT HEMATURIA: Primary | ICD-10-CM

## 2018-04-04 DIAGNOSIS — R50.9 FEVER, UNSPECIFIED FEVER CAUSE: ICD-10-CM

## 2018-04-04 LAB
BILIRUB UR QL STRIP: NEGATIVE
CTP QC/QA: YES
FLUAV AG NPH QL: NEGATIVE
FLUBV AG NPH QL: NEGATIVE
GLUCOSE UR QL STRIP: NEGATIVE
KETONES UR QL STRIP: NEGATIVE
LEUKOCYTE ESTERASE UR QL STRIP: POSITIVE
PH, POC UA: 5.5 (ref 5–8)
POC BLOOD, URINE: POSITIVE
POC NITRATES, URINE: NEGATIVE
PROT UR QL STRIP: NEGATIVE
SP GR UR STRIP: 1.01 (ref 1–1.03)
UROBILINOGEN UR STRIP-ACNC: NORMAL (ref 0.1–1.1)

## 2018-04-04 PROCEDURE — 87804 INFLUENZA ASSAY W/OPTIC: CPT | Mod: 59,QW,S$GLB, | Performed by: FAMILY MEDICINE

## 2018-04-04 PROCEDURE — 99214 OFFICE O/P EST MOD 30 MIN: CPT | Mod: 25,S$GLB,, | Performed by: FAMILY MEDICINE

## 2018-04-04 PROCEDURE — 81003 URINALYSIS AUTO W/O SCOPE: CPT | Mod: QW,S$GLB,, | Performed by: FAMILY MEDICINE

## 2018-04-04 RX ORDER — SULFAMETHOXAZOLE AND TRIMETHOPRIM 800; 160 MG/1; MG/1
1 TABLET ORAL 2 TIMES DAILY
Qty: 10 TABLET | Refills: 0 | Status: SHIPPED | OUTPATIENT
Start: 2018-04-04 | End: 2018-04-04 | Stop reason: SDUPTHER

## 2018-04-04 RX ORDER — PHENAZOPYRIDINE HYDROCHLORIDE 200 MG/1
200 TABLET, FILM COATED ORAL 3 TIMES DAILY PRN
Qty: 6 TABLET | Refills: 0 | Status: SHIPPED | OUTPATIENT
Start: 2018-04-04 | End: 2018-04-06

## 2018-04-04 RX ORDER — SULFAMETHOXAZOLE AND TRIMETHOPRIM 800; 160 MG/1; MG/1
1 TABLET ORAL 2 TIMES DAILY
Qty: 10 TABLET | Refills: 0 | Status: SHIPPED | OUTPATIENT
Start: 2018-04-04 | End: 2018-04-09

## 2018-04-04 RX ORDER — PHENAZOPYRIDINE HYDROCHLORIDE 200 MG/1
200 TABLET, FILM COATED ORAL 3 TIMES DAILY PRN
Qty: 6 TABLET | Refills: 0 | Status: SHIPPED | OUTPATIENT
Start: 2018-04-04 | End: 2018-04-04 | Stop reason: SDUPTHER

## 2018-04-04 NOTE — PROGRESS NOTES
"Subjective:       Patient ID: Leena Christina is a 77 y.o. female.    Vitals:  height is 5' 3" (1.6 m) and weight is 59 kg (130 lb). Her oral temperature is 99.3 °F (37.4 °C). Her blood pressure is 93/54 (abnormal) and her pulse is 101. Her respiration is 18 and oxygen saturation is 97%.     Chief Complaint: Fever    Fever    This is a new problem. The current episode started yesterday. The problem occurs 2 to 4 times per day. The problem has been gradually improving. The maximum temperature noted was 101 to 101.9 F. The temperature was taken using an oral thermometer. Associated symptoms include diarrhea and headaches. Pertinent negatives include no abdominal pain, chest pain, nausea, rash, sore throat or vomiting. She has tried acetaminophen for the symptoms. The treatment provided moderate relief.     Review of Systems   Constitution: Positive for chills and fever.   HENT: Negative for sore throat.    Eyes: Negative for blurred vision.   Cardiovascular: Negative for chest pain.   Respiratory: Negative for shortness of breath.    Skin: Negative for rash.   Musculoskeletal: Positive for back pain. Negative for joint pain.   Gastrointestinal: Positive for diarrhea. Negative for abdominal pain, nausea and vomiting.   Neurological: Positive for headaches.   Psychiatric/Behavioral: The patient is not nervous/anxious.        Objective:      Physical Exam   Constitutional: She is oriented to person, place, and time. She appears well-developed.   HENT:   Head: Normocephalic.   Nose: Nose normal.   Mouth/Throat: Oropharynx is clear and moist.   Eyes: Conjunctivae and EOM are normal. Pupils are equal, round, and reactive to light.   Cardiovascular: Normal rate and regular rhythm.    Murmur heard.   Systolic murmur is present with a grade of 2/6   Pulmonary/Chest: Breath sounds normal.   Abdominal: Bowel sounds are normal. There is no tenderness.   Neurological: She is alert and oriented to person, place, and time.   Skin: " "  Mild jaundice       Assessment:       1. Acute cystitis without hematuria    2. Fever, unspecified fever cause        Plan:         Acute cystitis without hematuria  -     Urine culture  -     sulfamethoxazole-trimethoprim 800-160mg (BACTRIM DS) 800-160 mg Tab; Take 1 tablet by mouth 2 (two) times daily.  Dispense: 10 tablet; Refill: 0  -     phenazopyridine (PYRIDIUM) 200 MG tablet; Take 1 tablet (200 mg total) by mouth 3 (three) times daily as needed for Pain.  Dispense: 6 tablet; Refill: 0    Fever, unspecified fever cause  -     POCT Urinalysis, Dipstick, Automated, W/O Scope  -     POCT Influenza A/B  Patient Instructions     Bladder Infection, Female (Adult)    Urine is normally doesn't have any bacteria in it. But bacteria can get into the urinary tract from the skin around the rectum. Or they can travel in the blood from elsewhere in the body. Once they are in your urinary tract, they can cause infection in the urethra (urethritis), the bladder (cystitis), or the kidneys (pyelonephritis).  The most common place for an infection is in the bladder. This is called a bladder infection. This is one of the most common infections in women. Most bladder infections are easily treated. They are not serious unless the infection spreads to the kidney.  The phrases "bladder infection," "UTI," and "cystitis" are often used to describe the same thing. But they are not always the same. Cystitis is an inflammation of the bladder. The most common cause of cystitis is an infection.  Symptoms  The infection causes inflammation in the urethra and bladder. This causes many of the symptoms. The most common symptoms of a bladder infection are:  · Pain or burning when urinating  · Having to urinate more often than usual  · Urgent need to urinate  · Only a small amount of urine comes out  · Blood in urine  · Abdominal discomfort. This is usually in the lower abdomen above the pubic bone.  · Cloudy urine  · Strong- or bad-smelling " urine  · Unable to urinate (urinary retention)  · Unable to hold urine in (urinary incontinence)  · Fever  · Loss of appetite  · Confusion (in older adults)  Causes  Bladder infections are not contagious. You can't get one from someone else, from a toilet seat, or from sharing a bath.  The most common cause of bladder infections is bacteria from the bowels. The bacteria get onto the skin around the opening of the urethra. From there, they can get into the urine and travel up to the bladder, causing inflammation and infection. This usually happens because of:  · Wiping improperly after urinating. Always wipe from front to back.  · Bowel incontinence  · Pregnancy  · Procedures such as having a catheter inserted  · Older age  · Not emptying your bladder. This can allow bacteria a chance to grow in your urine.  · Dehydration  · Constipation  · Sex  · Use of a diaphragm for birth control   Treatment  Bladder infections are diagnosed by a urine test. They are treated with antibiotics and usually clear up quickly without complications. Treatment helps prevent a more serious kidney infection.  Medicines  Medicines can help in the treatment of a bladder infection:  · Take antibiotics until they are used up, even if you feel better. It is important to finish them to make sure the infection has cleared.  · You can use acetaminophen or ibuprofen for pain, fever, or discomfort, unless another medicine was prescribed. If you have chronic liver or kidney disease, talk with your healthcare provider before using these medicines. Also talk with your provider if you've ever had a stomach ulcer or gastrointestinal bleeding, or are taking blood-thinner medicines.  · If you are given phenazopydridine to reduce burning with urination, it will cause your urine to become a bright orange color. This can stain clothing.  Care and prevention  These self-care steps can help prevent future infections:  · Drink plenty of fluids to prevent  dehydration and flush out your bladder. Do this unless you must restrict fluids for other health reasons, or your doctor told you not to.  · Proper cleaning after going to the bathroom is important. Wipe from front to back after using the toilet to prevent the spread of bacteria.  · Urinate more often. Don't try to hold urine in for a long time.  · Wear loose-fitting clothes and cotton underwear. Avoid tight-fitting pants.  · Improve your diet and prevent constipation. Eat more fresh fruit and vegetables, and fiber, and less junk and fatty foods.  · Avoid sex until your symptoms are gone.  · Avoid caffeine, alcohol, and spicy foods. These can irritate your bladder.  · Urinate right after intercourse to flush out your bladder.  · If you use birth control pills and have frequent bladder infections, discuss it with your doctor.  Follow-up care  Call your healthcare provider if all symptoms are not gone after 3 days of treatment. This is especially important if you have repeat infections.  If a culture was done, you will be told if your treatment needs to be changed. If directed, you can call to find out the results.  If X-rays were done, you will be told if the results will affect your treatment.  Call 911  Call 911 if any of the following occur:  · Trouble breathing  · Hard to wake up or confusion  · Fainting or loss of consciousness  · Rapid heart rate  When to seek medical advice  Call your healthcare provider right away if any of these occur:  · Fever of 100.4ºF (38.0ºC) or higher, or as directed by your healthcare provider  · Symptoms are not better by the third day of treatment  · Back or belly (abdominal) pain that gets worse  · Repeated vomiting, or unable to keep medicine down  · Weakness or dizziness  · Vaginal discharge  · Pain, redness, or swelling in the outer vaginal area (labia)  Date Last Reviewed: 10/1/2016  © 4609-7403 [employer]. [address] [place] [address], [place] [address]. All rights  reserved. This information is not intended as a substitute for professional medical care. Always follow your healthcare professional's instructions.

## 2018-04-04 NOTE — PATIENT INSTRUCTIONS

## 2018-04-08 ENCOUNTER — TELEPHONE (OUTPATIENT)
Dept: URGENT CARE | Facility: CLINIC | Age: 78
End: 2018-04-08

## 2018-04-08 LAB
BACTERIA UR CULT: ABNORMAL
BACTERIA UR CULT: ABNORMAL
OTHER ANTIBIOTIC SUSC ISLT: ABNORMAL

## 2018-04-08 NOTE — TELEPHONE ENCOUNTER
----- Message from Bela Leary NP sent at 4/8/2018  9:21 AM CDT -----  Please call the patient regarding her positive urine culture result. Pt was treated appropriately. Please instruct her to complete antibiotics as prescribed and follow up with her pcp for any further issues

## 2018-04-10 ENCOUNTER — HOSPITAL ENCOUNTER (OUTPATIENT)
Dept: RADIOLOGY | Facility: HOSPITAL | Age: 78
Discharge: HOME | End: 2018-04-10
Attending: NURSE PRACTITIONER
Payer: MEDICARE

## 2018-04-10 ENCOUNTER — OFFICE VISIT (OUTPATIENT)
Dept: FAMILY MEDICINE | Facility: CLINIC | Age: 78
End: 2018-04-10
Payer: MEDICARE

## 2018-04-10 VITALS
BODY MASS INDEX: 23.43 KG/M2 | TEMPERATURE: 98 F | WEIGHT: 132.25 LBS | HEIGHT: 63 IN | OXYGEN SATURATION: 97 % | SYSTOLIC BLOOD PRESSURE: 128 MMHG | HEART RATE: 84 BPM | DIASTOLIC BLOOD PRESSURE: 62 MMHG

## 2018-04-10 DIAGNOSIS — S09.90XA TRAUMATIC INJURY OF HEAD, INITIAL ENCOUNTER: ICD-10-CM

## 2018-04-10 DIAGNOSIS — N39.0 URINARY TRACT INFECTION WITH HEMATURIA, SITE UNSPECIFIED: Primary | ICD-10-CM

## 2018-04-10 DIAGNOSIS — H55.09 VERTICAL NYSTAGMUS: ICD-10-CM

## 2018-04-10 DIAGNOSIS — R31.9 URINARY TRACT INFECTION WITH HEMATURIA, SITE UNSPECIFIED: Primary | ICD-10-CM

## 2018-04-10 DIAGNOSIS — R44.3 HALLUCINATION: ICD-10-CM

## 2018-04-10 PROCEDURE — 99215 OFFICE O/P EST HI 40 MIN: CPT | Mod: PBBFAC,25,PO | Performed by: NURSE PRACTITIONER

## 2018-04-10 PROCEDURE — 99215 OFFICE O/P EST HI 40 MIN: CPT | Mod: S$PBB,,, | Performed by: NURSE PRACTITIONER

## 2018-04-10 PROCEDURE — 99999 PR PBB SHADOW E&M-EST. PATIENT-LVL V: CPT | Mod: PBBFAC,,, | Performed by: NURSE PRACTITIONER

## 2018-04-10 PROCEDURE — 70450 CT HEAD/BRAIN W/O DYE: CPT | Mod: TC

## 2018-04-10 PROCEDURE — 70450 CT HEAD/BRAIN W/O DYE: CPT | Mod: 26,,, | Performed by: RADIOLOGY

## 2018-04-10 RX ORDER — PANTOPRAZOLE SODIUM 40 MG/1
40 TABLET, DELAYED RELEASE ORAL DAILY
COMMUNITY
Start: 2018-03-02 | End: 2018-06-08 | Stop reason: ALTCHOICE

## 2018-04-10 RX ORDER — PRAMIPEXOLE 0.38 MG/1
0.38 TABLET, EXTENDED RELEASE ORAL DAILY
COMMUNITY
Start: 2018-04-03 | End: 2019-05-20 | Stop reason: SDUPTHER

## 2018-04-10 NOTE — PROGRESS NOTES
This dictation has been generated using Dragon Dictation some phonetic errors may occur.     Problem List Items Addressed This Visit     None      Visit Diagnoses     Urinary tract infection with hematuria, site unspecified    -  Primary    Traumatic injury of head, initial encounter        Relevant Orders    CT Head Without Contrast (Completed)    Vertical nystagmus        Relevant Orders    CT Head Without Contrast (Completed)    Hallucination        visual    Relevant Orders    Ambulatory referral to Neurology        Orders Placed This Encounter    CT Head Without Contrast    Ambulatory referral to Neurology     Urinary tract infection.  Escherichia coli UTI noted.  Compled antibiotic.  Head trauma, hallucinations, and vertical nystagmus.  While I suspect hallucinations likely was due to UTI or peridium use have to consider that might be related to recent head trauma.  We will check a CT as above and rule out a slow bleed.   Hallucinations and history of trauma with hallucinations resolved however referring to neurology for their input.      Follow-up in about 2 days (around 4/12/2018), or if symptoms worsen or fail to improve.    ________________________________________________________________  ________________________________________________________________      Chief Complaint   Patient presents with    Fall     hit head     History of present illness  This 77 y.o. presents today for complaint of UTI symptoms on [facility].  She is completed Bactrim yesterday.  She was prescribed Bactrim for an Escherichia coli urinary tract infection.  Urine culture was reviewed.  She had a high fever at that time.  The following day she took Pyridium in the PM and had a fall.  Patient reports she tripped and fell.  She did hit the back of her head.  She had a bruise on the right arm.  Patient did note a headache.  She denies loss of consciousness.  She had some hallucinations over that 24  hour period.  Patient had visual hallucinations and seeing a snake in bed, cobble labs, and bugs.  She did call a friend but doesn't remember the phone call.  She looked back at her phone and saw that she had indeed called her friend at that time.  Her friend states that she said she saw somebody in the house and was asking that they call the police.  Review of systems  No fever or chills  Neurologic.  No headache.  No unilateral weakness.  No speech problems.  She does feel unbalanced  No chest pain.  Stable shortness of breath.  No nausea vomiting or diarrhea  No urinary urgency frequency or dysuria.  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    HTN (hypertension) 1/24/2013    Hyperlipidemia 1/24/2013    Insomnia 7/18/2014    Ovarian cancer 1/24/2013       Past Surgical History:   Procedure Laterality Date    BREAST CYST EXCISION      HYSTERECTOMY      OOPHORECTOMY         Family History   Problem Relation Age of Onset    Breast cancer Other        Social History     Social History    Marital status:      Spouse name: N/A    Number of children: N/A    Years of education: N/A     Social History Main Topics    Smoking status: Never Smoker    Smokeless tobacco: Never Used    Alcohol use None    Drug use: Unknown    Sexual activity: Not Asked     Other Topics Concern    None     Social History Narrative    None       Current Outpatient Prescriptions   Medication Sig Dispense Refill    aspirin (ECOTRIN) 81 MG EC tablet Take 81 mg by mouth once daily.        atorvastatin (LIPITOR) 40 MG tablet Take 1 tablet (40 mg total) by mouth once daily. 90 tablet 90    B INFANTIS/B ANI/B YARELY/B BIFID  (PROBIOTIC 4X ORAL) Take by mouth.      blood sugar diagnostic Strp TEST 1 TIMES DAILY 100 strip 12    celecoxib (CELEBREX) 200 MG capsule TAKE 1 CAPSULE EVERY OTHER DAY 90 capsule 3    fish oil-omega-3 fatty acids 300-1,000 mg capsule Take 2 g by mouth once daily.        glucosamine-chondroitin 500-400 mg tablet Take 1 tablet by mouth 2 (two) times daily.        hydroCHLOROthiazide (HYDRODIURIL) 25 MG tablet Take 1 tablet (25 mg total) by mouth once daily. 90 tablet 3    metFORMIN (GLUCOPHAGE) 500 MG tablet TAKE 1 TABLET TWICE A DAY WITH MEALS 180 tablet 2    NEXIUM 40 mg capsule TAKE 1 CAPSULE DAILY 90 capsule 11    oxycodone-acetaminophen (PERCOCET) 5-325 mg per tablet       pantoprazole (PROTONIX) 40 MG tablet Take 40 mg by mouth once daily.      pramipexole 0.375 mg Tb24 Take 0.375 mg by mouth once daily.      ramipril (ALTACE) 5 MG capsule TAKE 1 CAPSULE DAILY 90 capsule 0    rosuvastatin (CRESTOR) 10 MG tablet Take 10 mg by mouth once daily.      sertraline (ZOLOFT) 100 MG tablet Take 2 tablets (200 mg total) by mouth once daily. 180 tablet 12    valsartan (DIOVAN) 160 MG tablet Take 1 tablet (160 mg total) by mouth once daily. 90 tablet 12    ZEJULA 100 mg Cap       zolpidem (AMBIEN) 5 MG Tab TAKE 1 TO 2 TABLETS BY MOUTH AT BEDTIME 45 tablet 5     No current facility-administered medications for this visit.        Review of patient's allergies indicates:   Allergen Reactions    Dilaudid [hydromorphone] Other (See Comments)     hallucinations       Physical examination  Vitals Reviewed  Gen. Well-dressed well-nourished   Skin warm dry and intact.  No rashes noted.  HEENT.  TM intact bilateral with normal light reflex.  No mastoid tenderness during percussion.  Nares patent bilateral.  Pharynx is unremarkable.  No maxillary or frontal sinus tenderness when percussed.    Neck is supple without adenopathy  Chest.  Respirations are even unlabored.  Lungs are clear to auscultation.  Cardiac  regular rate and rhythm.  No chest wall adenopathy noted.  Neuro. Awake alert oriented x4.  Normal judgment and cognition noted.  Cranial nerves II through XII intact.  Vertical nystagmus noted.  Extremities no clubbing cyanosis or edema noted.     Call or return to clinic prn if these symptoms worsen or fail to improve as anticipated.    Answers for HPI/ROS submitted by the patient on 4/9/2018   activity change: No  unexpected weight change: No  neck pain: No  hearing loss: No  rhinorrhea: No  trouble swallowing: No  eye discharge: No  visual disturbance: No  chest tightness: No  wheezing: No  chest pain: No  palpitations: No  blood in stool: No  constipation: No  vomiting: No  diarrhea: No  polydipsia: No  polyuria: No  difficulty urinating: No  hematuria: Yes  menstrual problem: No  dysuria: No  joint swelling: No  arthralgias: Yes  headaches: No  weakness: No  confusion: Yes  dysphoric mood: No

## 2018-04-11 ENCOUNTER — PATIENT MESSAGE (OUTPATIENT)
Dept: FAMILY MEDICINE | Facility: CLINIC | Age: 78
End: 2018-04-11

## 2018-04-25 ENCOUNTER — TELEPHONE (OUTPATIENT)
Dept: ADMINISTRATIVE | Facility: HOSPITAL | Age: 78
End: 2018-04-25

## 2018-04-25 DIAGNOSIS — E11.9 TYPE 2 DIABETES MELLITUS WITHOUT COMPLICATION, WITHOUT LONG-TERM CURRENT USE OF INSULIN: Primary | ICD-10-CM

## 2018-04-25 NOTE — TELEPHONE ENCOUNTER
Patient walked in and had a Care Touch letter and was confused why.  Had tried calling the number listed on the letter and no able to speak with anyone.  Patient is going out of town and requested a follow-up appointment with Ed Griffin NP for UTI and to have needed fasting labs.  Patient scheduled for tomorrow with Mr Griffin to arrive at 0700 and fasting labs and urine at 0800.  Patient given appointment reminder letters.

## 2018-04-26 ENCOUNTER — OFFICE VISIT (OUTPATIENT)
Dept: FAMILY MEDICINE | Facility: CLINIC | Age: 78
End: 2018-04-26
Payer: MEDICARE

## 2018-04-26 ENCOUNTER — LAB VISIT (OUTPATIENT)
Dept: LAB | Facility: HOSPITAL | Age: 78
End: 2018-04-26
Attending: NURSE PRACTITIONER
Payer: MEDICARE

## 2018-04-26 VITALS
HEIGHT: 63 IN | DIASTOLIC BLOOD PRESSURE: 68 MMHG | WEIGHT: 129.19 LBS | TEMPERATURE: 98 F | OXYGEN SATURATION: 96 % | BODY MASS INDEX: 22.89 KG/M2 | SYSTOLIC BLOOD PRESSURE: 122 MMHG | HEART RATE: 82 BPM

## 2018-04-26 DIAGNOSIS — C56.9 MALIGNANT NEOPLASM OF OVARY, UNSPECIFIED LATERALITY: ICD-10-CM

## 2018-04-26 DIAGNOSIS — D69.6 THROMBOCYTOPENIA: ICD-10-CM

## 2018-04-26 DIAGNOSIS — E11.9 TYPE 2 DIABETES MELLITUS WITHOUT COMPLICATION, WITHOUT LONG-TERM CURRENT USE OF INSULIN: ICD-10-CM

## 2018-04-26 DIAGNOSIS — R39.9 UTI SYMPTOMS: Primary | ICD-10-CM

## 2018-04-26 DIAGNOSIS — G62.9 NEUROPATHY: ICD-10-CM

## 2018-04-26 LAB
BILIRUB SERPL-MCNC: ABNORMAL MG/DL
BLOOD URINE, POC: ABNORMAL
CHOLEST SERPL-MCNC: 143 MG/DL
CHOLEST/HDLC SERPL: 3.8 {RATIO}
COLOR, POC UA: YELLOW
ESTIMATED AVG GLUCOSE: 105 MG/DL
GLUCOSE UR QL STRIP: 50
HBA1C MFR BLD HPLC: 5.3 %
HDLC SERPL-MCNC: 38 MG/DL
HDLC SERPL: 26.6 %
KETONES UR QL STRIP: ABNORMAL
LDLC SERPL CALC-MCNC: 68.2 MG/DL
LEUKOCYTE ESTERASE URINE, POC: ABNORMAL
NITRITE, POC UA: ABNORMAL
NONHDLC SERPL-MCNC: 105 MG/DL
PH, POC UA: 5
PROTEIN, POC: POSITIVE
SPECIFIC GRAVITY, POC UA: 1
TRIGL SERPL-MCNC: 184 MG/DL
UROBILINOGEN, POC UA: NORMAL

## 2018-04-26 PROCEDURE — 99999 PR PBB SHADOW E&M-EST. PATIENT-LVL V: CPT | Mod: PBBFAC,,, | Performed by: NURSE PRACTITIONER

## 2018-04-26 PROCEDURE — 80061 LIPID PANEL: CPT

## 2018-04-26 PROCEDURE — 36415 COLL VENOUS BLD VENIPUNCTURE: CPT | Mod: PO

## 2018-04-26 PROCEDURE — 81002 URINALYSIS NONAUTO W/O SCOPE: CPT | Mod: PBBFAC,PO | Performed by: NURSE PRACTITIONER

## 2018-04-26 PROCEDURE — 99214 OFFICE O/P EST MOD 30 MIN: CPT | Mod: S$PBB,,, | Performed by: NURSE PRACTITIONER

## 2018-04-26 PROCEDURE — 99215 OFFICE O/P EST HI 40 MIN: CPT | Mod: PBBFAC,PO | Performed by: NURSE PRACTITIONER

## 2018-04-26 PROCEDURE — 83036 HEMOGLOBIN GLYCOSYLATED A1C: CPT

## 2018-04-26 RX ORDER — DOXYCYCLINE 100 MG/1
100 CAPSULE ORAL 2 TIMES DAILY
Qty: 20 CAPSULE | Refills: 0 | Status: SHIPPED | OUTPATIENT
Start: 2018-04-26 | End: 2018-06-08 | Stop reason: ALTCHOICE

## 2018-04-26 NOTE — PROGRESS NOTES
This dictation has been generated using Dragon Dictation some phonetic errors may occur.     Problem List Items Addressed This Visit     Ovarian cancer    Overview     Follows with GURJIT Justin Mairakarla  She is on med low dose due to thrombocytopenia.          Diabetes mellitus, type 2  Stable and controlled continue current therapy      Thrombocytopenia    Overview     Chemo med induced.   Stable and controlled continue to monitor.  Improvement noted recently.  Recent labs from outpatient source sent to the chart.           Other Visit Diagnoses     UTI symptoms    -  Primary    Relevant Orders    POCT urine dipstick without microscope (Completed)    Urine culture     UTI symptoms check point of service urinalysis.  Trace leukocytes noted UTI per my interpretation.  Culture is pending.  I'll review results and address accordingly.        Neuropathy        Relevant Orders    Ambulatory referral to Podiatry  Neuropathy with history of diabetes.  Given presentation I suspect it is due to a lumbar impingement and not diabetic neuropathy.    ISAIAH Bowman opt. Up to date per pt report.         Orders Placed This Encounter    Urine culture    Ambulatory referral to Podiatry    POCT urine dipstick without microscope    doxycycline (MONODOX) 100 MG capsule       Follow-up if symptoms worsen or fail to improve.    ________________________________________________________________  ________________________________________________________________      Chief Complaint   Patient presents with    Urinary Tract Infection     follow-up     History of present illness  This 77 y.o. presents today for complaint of urinary frequency.  Patient notes frequent voiding and note small amounts.  She notes urge to void.  She denies any dysuria.  Patient denies any fever or chills.  She hasn't taken any med for her symptoms.  Symptoms have been present for more than a week.  She is diabetic.  Her blood sugars have historically been controlled.   Currently on meds and tolerating without side effects.  She has thrombocytopenia secondary to med treatment from a primary ovarian cancer.  She follows with Dr. Tucker and has some recent lab work for our record.  I share those with primary care physician and will have them copied to the chart.  ROS:   CONST: weight stable.  EYES: no vision change.  ENT: No sore throat, headache, or earache.  CV: no chest pain w/ exertion.  RESP: No shortness of breath.  GI: no nausea, vomiting, diarrhea. No dysphagia. Appetite good.   : Urinary frequency.  MUSCULOSKELETAL: no new myalgias or arthralgias.  SKIN: no new changes.  NEURO: no focal deficits. Denies headache.  PSYCH: no new issues.  ENDOCRINE: No polydipsia.  HEME: no lymph nodes.  ALLERGY: no general pruritis.      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    HTN (hypertension) 1/24/2013    Hyperlipidemia 1/24/2013    Insomnia 7/18/2014    Ovarian cancer 1/24/2013       Past Surgical History:   Procedure Laterality Date    BREAST CYST EXCISION      HYSTERECTOMY      OOPHORECTOMY         Family History   Problem Relation Age of Onset    Breast cancer Other        Social History     Social History    Marital status:      Spouse name: N/A    Number of children: N/A    Years of education: N/A     Social History Main Topics    Smoking status: Never Smoker    Smokeless tobacco: Never Used    Alcohol use None    Drug use: Unknown    Sexual activity: Not Asked     Other Topics Concern    None     Social History Narrative    None       Current Outpatient Prescriptions   Medication Sig Dispense Refill    aspirin (ECOTRIN) 81 MG EC tablet Take 81 mg by mouth once daily.        atorvastatin (LIPITOR) 40 MG tablet Take 1 tablet  (40 mg total) by mouth once daily. 90 tablet 90    B INFANTIS/B ANI/B YARELY/B BIFID (PROBIOTIC 4X ORAL) Take by mouth.      blood sugar diagnostic Strp TEST 1 TIMES DAILY 100 strip 12    celecoxib (CELEBREX) 200 MG capsule TAKE 1 CAPSULE EVERY OTHER DAY 90 capsule 3    fish oil-omega-3 fatty acids 300-1,000 mg capsule Take 2 g by mouth once daily.        glucosamine-chondroitin 500-400 mg tablet Take 1 tablet by mouth 2 (two) times daily.        hydroCHLOROthiazide (HYDRODIURIL) 25 MG tablet Take 1 tablet (25 mg total) by mouth once daily. 90 tablet 3    metFORMIN (GLUCOPHAGE) 500 MG tablet TAKE 1 TABLET TWICE A DAY WITH MEALS 180 tablet 2    NEXIUM 40 mg capsule TAKE 1 CAPSULE DAILY 90 capsule 11    oxycodone-acetaminophen (PERCOCET) 5-325 mg per tablet       pantoprazole (PROTONIX) 40 MG tablet Take 40 mg by mouth once daily.      pramipexole 0.375 mg Tb24 Take 0.375 mg by mouth once daily.      ramipril (ALTACE) 5 MG capsule TAKE 1 CAPSULE DAILY 90 capsule 0    rosuvastatin (CRESTOR) 10 MG tablet Take 10 mg by mouth once daily.      sertraline (ZOLOFT) 100 MG tablet Take 2 tablets (200 mg total) by mouth once daily. 180 tablet 12    valsartan (DIOVAN) 160 MG tablet Take 1 tablet (160 mg total) by mouth once daily. 90 tablet 12    ZEJULA 100 mg Cap       zolpidem (AMBIEN) 5 MG Tab TAKE 1 TO 2 TABLETS BY MOUTH AT BEDTIME 45 tablet 5    doxycycline (MONODOX) 100 MG capsule Take 1 capsule (100 mg total) by mouth 2 (two) times daily. 20 capsule 0     No current facility-administered medications for this visit.        Review of patient's allergies indicates:   Allergen Reactions    Dilaudid [hydromorphone] Other (See Comments)     hallucinations       Physical examination  Vitals Reviewed  Gen. Well-dressed well-nourished no apparent distress  Skin warm dry and intact.  No rashes noted.  Neck is supple without adenopathy  Chest.  Respirations are even unlabored.  Lungs are clear to auscultation.   Cardiac regular rate and rhythm.  No chest wall adenopathy noted.  Neuro. Awake alert oriented x4.  Normal judgment and cognition noted.  Extremities no clubbing cyanosis or edema noted.   Protective Sensation (w/ 10 gram monofilament):  Right: Intact  Left: Decreased  L4/L5 distribution    Visual Inspection:  Normal -  Bilateral    Pedal Pulses:   Right: Present  Left: Present    Posterior tibialis:   Right:Present  Left: Present       Call or return to clinic prn if these symptoms worsen or fail to improve as anticipated.

## 2018-04-30 ENCOUNTER — PATIENT MESSAGE (OUTPATIENT)
Dept: FAMILY MEDICINE | Facility: CLINIC | Age: 78
End: 2018-04-30

## 2018-04-30 DIAGNOSIS — E11.8 TYPE 2 DIABETES MELLITUS WITH COMPLICATION, UNSPECIFIED WHETHER LONG TERM INSULIN USE: Primary | ICD-10-CM

## 2018-05-02 RX ORDER — CELECOXIB 200 MG/1
CAPSULE ORAL
Qty: 90 CAPSULE | Refills: 3 | Status: SHIPPED | OUTPATIENT
Start: 2018-05-02 | End: 2019-05-20 | Stop reason: SDUPTHER

## 2018-05-02 NOTE — TELEPHONE ENCOUNTER
----- Message from Mitzi Shah sent at 5/2/2018 10:36 AM CDT -----  Contact: Self  Pt called to check status of request for blood diagnostic test strips. Pt states she's going out of town tomorrow morning. Pt can be reached @ [phone].

## 2018-05-10 ENCOUNTER — PATIENT MESSAGE (OUTPATIENT)
Dept: FAMILY MEDICINE | Facility: CLINIC | Age: 78
End: 2018-05-10

## 2018-05-11 NOTE — TELEPHONE ENCOUNTER
Its silly but allergies have to be reviewed prior even though strips, considered a med by computer.

## 2018-05-14 RX ORDER — LANCETS 28 GAUGE
1 EACH MISCELLANEOUS 3 TIMES DAILY
Qty: 200 EACH | Refills: 3 | Status: SHIPPED | OUTPATIENT
Start: 2018-05-14

## 2018-05-14 RX ORDER — INSULIN PUMP SYRINGE, 3 ML
EACH MISCELLANEOUS
Qty: 1 EACH | Refills: 0 | Status: SHIPPED | OUTPATIENT
Start: 2018-05-14 | End: 2019-05-10

## 2018-05-17 ENCOUNTER — OFFICE VISIT (OUTPATIENT)
Dept: FAMILY MEDICINE | Facility: CLINIC | Age: 78
End: 2018-05-17
Payer: MEDICARE

## 2018-05-17 VITALS
SYSTOLIC BLOOD PRESSURE: 140 MMHG | DIASTOLIC BLOOD PRESSURE: 68 MMHG | BODY MASS INDEX: 23.5 KG/M2 | WEIGHT: 132.63 LBS | TEMPERATURE: 98 F | OXYGEN SATURATION: 98 % | HEIGHT: 63 IN | HEART RATE: 89 BPM

## 2018-05-17 DIAGNOSIS — N18.30 STAGE 3 CHRONIC KIDNEY DISEASE: ICD-10-CM

## 2018-05-17 DIAGNOSIS — M54.31 RIGHT SIDED SCIATICA: Primary | ICD-10-CM

## 2018-05-17 PROCEDURE — 99214 OFFICE O/P EST MOD 30 MIN: CPT | Mod: S$PBB,,, | Performed by: NURSE PRACTITIONER

## 2018-05-17 PROCEDURE — 99999 PR PBB SHADOW E&M-EST. PATIENT-LVL V: CPT | Mod: PBBFAC,,, | Performed by: NURSE PRACTITIONER

## 2018-05-17 PROCEDURE — 99215 OFFICE O/P EST HI 40 MIN: CPT | Mod: PBBFAC,PO | Performed by: NURSE PRACTITIONER

## 2018-05-17 RX ORDER — TIZANIDINE 4 MG/1
4 TABLET ORAL EVERY 8 HOURS
Qty: 30 TABLET | Refills: 0 | Status: SHIPPED | OUTPATIENT
Start: 2018-05-17 | End: 2018-05-27

## 2018-05-17 NOTE — PROGRESS NOTES
Subjective:       Patient ID: Leena Christina is a 77 y.o. female.    Chief Complaint: Hip Pain (Left   3 days) and Sciatica (Left leg  3 days)    77-year-old female presents to the clinic today with complaint of right hip pain radiating down the back of right leg for the past 3 days.  She seems to think that her sciatica is acting up.  She's taken ibuprofen.  She quit exercising regularly for the past week and a half.  She used to do water aerobics.  She denies any known trauma.  She had this similar problem in the past.  I recommended she take Tylenol and use muscle relaxers.  This is due to her chronic kidney disease.  She denies any other complaints.      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 She has never used smokeless tobacco.  Review of Systems   Constitutional: Negative for activity change and unexpected weight change.   HENT: Negative for hearing loss, rhinorrhea and trouble swallowing.    Eyes: Negative for discharge and visual disturbance.   Respiratory: Negative for chest tightness and wheezing.    Cardiovascular: Negative for chest pain and palpitations.   Gastrointestinal: Negative for blood in stool, constipation, diarrhea and vomiting.   Endocrine: Negative for polydipsia and polyuria.   Genitourinary: Negative for difficulty urinating, dysuria, hematuria and menstrual problem.   Musculoskeletal: Positive for arthralgias. Negative for joint swelling and neck pain.   Neurological: Negative for weakness  and headaches.   Psychiatric/Behavioral: Negative for confusion and dysphoric mood.       Objective:      Physical Exam   Constitutional: She is oriented to person, place, and time. She appears well-developed and well-nourished. No distress.   Eyes: Conjunctivae and EOM are normal. Pupils are equal, round, and reactive to light. Right eye exhibits no discharge. Left eye exhibits no discharge. No scleral icterus.   Neck: Normal range of motion. Neck supple. No JVD present.   Cardiovascular: Normal rate, regular rhythm and normal heart sounds.    No murmur heard.  Pulmonary/Chest: Effort normal and breath sounds normal. No respiratory distress. She has no wheezes. She has no rales.   Abdominal: Soft. Bowel sounds are normal. There is no tenderness.   Musculoskeletal: Normal range of motion. She exhibits no edema.   Reproducible tenderness when palpable center of right buttock causing pain to shoot down right leg    Neurological: She is alert and oriented to person, place, and time.   Skin: Skin is warm and dry. She is not diaphoretic.   Psychiatric: She has a normal mood and affect.       Assessment:       1. Right sided sciatica    2. Stage 3 chronic kidney disease        Plan:         Right sided sciatica  -     tiZANidine (ZANAFLEX) 4 MG tablet; Take 1 tablet (4 mg total) by mouth every 8 (eight) hours.  Dispense: 30 tablet; Refill: 0    Stage 3 chronic kidney disease  - avoid all anti-inflammatories  - Take Tylenol as needed

## 2018-05-23 ENCOUNTER — OFFICE VISIT (OUTPATIENT)
Dept: NEUROLOGY | Facility: CLINIC | Age: 78
End: 2018-05-23
Payer: MEDICARE

## 2018-05-23 VITALS
HEIGHT: 63 IN | WEIGHT: 132.5 LBS | SYSTOLIC BLOOD PRESSURE: 133 MMHG | BODY MASS INDEX: 23.48 KG/M2 | HEART RATE: 100 BPM | DIASTOLIC BLOOD PRESSURE: 77 MMHG

## 2018-05-23 DIAGNOSIS — B99.9 CONFUSION ASSOCIATED WITH INFECTION: Primary | ICD-10-CM

## 2018-05-23 DIAGNOSIS — R41.0 CONFUSION ASSOCIATED WITH INFECTION: Primary | ICD-10-CM

## 2018-05-23 PROCEDURE — 99204 OFFICE O/P NEW MOD 45 MIN: CPT | Mod: S$PBB,,, | Performed by: NEUROLOGICAL SURGERY

## 2018-05-23 PROCEDURE — 99999 PR PBB SHADOW E&M-EST. PATIENT-LVL III: CPT | Mod: PBBFAC,,, | Performed by: NEUROLOGICAL SURGERY

## 2018-05-23 PROCEDURE — 99213 OFFICE O/P EST LOW 20 MIN: CPT | Mod: PBBFAC,PO | Performed by: NEUROLOGICAL SURGERY

## 2018-05-23 NOTE — LETTER
May 26, 2018      Ed Griffin, NP  [address]  [place] [address]           [facility]  [address] [facility]  [place] [address]  Phone: [phone]  Fax: [phone]          Patient: Leena Christina   MR Number: [id number]   YOB: 1940   Date of Visit: 5/23/2018       Dear Ed Griffin:    Thank you for referring Leena Christina to me for evaluation. Attached you will find relevant portions of my assessment and plan of care.    If you have questions, please do not hesitate to call me. I look forward to following Leena Christina along with you.    Sincerely,    Kennedy Vargas MD    Enclosure  CC:  No Recipients    If you would like to receive this communication electronically, please contact [email or web address] or [phone] to request more information on [employer] Link access.    For providers and/or their staff who would like to refer a patient to [facility], please contact us through our one-stop-shop provider referral line, [facility], at [phone].    If you feel you have received this communication in error or would no longer like to receive these types of communications, please e-mail [email or web address]

## 2018-05-26 NOTE — PROGRESS NOTES
Chief Complaint   Patient presents with    Hallucinations        Leena Christina is a 77 y.o. female with a history of multiple medical diagnoses as listed below that presents for evaluation of an episode of visual hallucinations and nystagmus that was noted on clinic visit.  The patient had been having these symptoms while she was hospitalized for a urinary tract infection.  Patient went to urgent care and was started on a course of Bactrim for a UTI.  The patient had a significant temperature elevation was noted at that time.  At some point during the night the patient endured a fall and called a friend.  The patient is unaware of the phone call, but the friend says that she remembers conversing with the patient and the patient telling her that she did not feel well, so she wanted her to call her periodically to check up on her.  During the course of this night the patient said that she was having hallucinations that she recalls is seen snakes and bugs in the bed with her.  She said that during the fall she may have hit her head but does not feel like she lost consciousness.  The phone call that the friend recollected other healthcare providers was that Ms. Stern called her to sit is somewhat was in her house and she needed her to call emergency services.  The patient did not in fact have anyone in her home the night, but she does not really remember this interaction.  She has never had any symptoms like this in the past.  She has not had any further symptoms since she cleared her UTI.  She denies any dizziness, lightheadedness, difficulty with concentration and focus, difficulty with sleeping.  She has no speech problems.    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     Social History Main Topics    Smoking status: Never Smoker    Smokeless tobacco: Never Used    Alcohol use Not on file    Drug use: Unknown    Sexual activity: Not on file     Other Topics Concern    Not on file     Social History Narrative    No narrative on file       FAMILY HISTORY:  Family History   Problem Relation Age of Onset    Breast cancer Other        ALLERGIES AND MEDICATIONS: updated and reviewed.  Review of patient's allergies indicates:   Allergen Reactions    Dilaudid [hydromorphone] Other (See Comments)     hallucinations     Current Outpatient Prescriptions   Medication Sig Dispense Refill    aspirin (ECOTRIN) 81 MG EC tablet Take 81 mg by mouth once daily.        atorvastatin (LIPITOR) 40 MG tablet Take 1 tablet (40 mg total) by mouth once daily. 90 tablet 90    B INFANTIS/B ANI/B YARELY/B BIFID (PROBIOTIC 4X ORAL) Take by mouth.      blood sugar diagnostic Strp TEST 1 TIMES DAILY 100 strip 12    blood sugar diagnostic Strp 1 strip by Misc.(Non-Drug; Combo Route) route 3 (three) times daily. 200 strip 3    blood-glucose meter kit Use as instructed 1 each 0    celecoxib (CELEBREX) 200 MG capsule TAKE 1 CAPSULE EVERY OTHER DAY 90 capsule 3    doxycycline (MONODOX) 100 MG capsule Take 1 capsule (100 mg total) by mouth 2 (two) times daily. 20 capsule 0    fish oil-omega-3 fatty acids 300-1,000 mg capsule Take 2 g by mouth once daily.        glucosamine-chondroitin 500-400 mg tablet Take 1 tablet by mouth 2 (two) times daily.         hydroCHLOROthiazide (HYDRODIURIL) 25 MG tablet Take 1 tablet (25 mg total) by mouth once daily. 90 tablet 3    lancets (FREESTYLE LANCETS) 28 gauge Misc 1 lancet by Misc.(Non-Drug; Combo Route) route 3 (three) times daily. 200 each 3    metFORMIN (GLUCOPHAGE) 500 MG tablet TAKE 1 TABLET TWICE A DAY WITH MEALS 180 tablet 2    NEXIUM 40 mg capsule TAKE 1 CAPSULE DAILY 90 capsule 11    oxycodone-acetaminophen (PERCOCET) 5-325 mg per tablet       pantoprazole (PROTONIX) 40 MG tablet Take 40 mg by mouth once daily.      pramipexole 0.375 mg Tb24 Take 0.375 mg by mouth once daily.      ramipril (ALTACE) 5 MG capsule TAKE 1 CAPSULE DAILY 90 capsule 0    rosuvastatin (CRESTOR) 10 MG tablet Take 10 mg by mouth once daily.      sertraline (ZOLOFT) 100 MG tablet Take 2 tablets (200 mg total) by mouth once daily. 180 tablet 12    tiZANidine (ZANAFLEX) 4 MG tablet Take 1 tablet (4 mg total) by mouth every 8 (eight) hours. 30 tablet 0    valsartan (DIOVAN) 160 MG tablet Take 1 tablet (160 mg total) by mouth once daily. 90 tablet 12    ZEJULA 100 mg Cap       zolpidem (AMBIEN) 5 MG Tab TAKE 1 TO 2 TABLETS BY MOUTH AT BEDTIME 45 tablet 5     No current facility-administered medications for this visit.        Review of Systems   Constitutional: Negative for activity change, appetite change, fever and unexpected weight change.   HENT: Negative for trouble swallowing and voice change.    Eyes: Negative for photophobia and visual disturbance.   Respiratory: Negative for apnea and shortness of breath.    Cardiovascular: Negative for chest pain and leg swelling.   Gastrointestinal: Negative for constipation and nausea.   Genitourinary: Negative for difficulty urinating.   Musculoskeletal: Negative for back pain, gait problem and neck pain.   Skin: Negative for color change and pallor.   Neurological: Negative for dizziness, seizures, syncope, weakness and numbness.   Hematological: Negative for adenopathy.    Psychiatric/Behavioral: Negative for agitation, confusion and decreased concentration.       Neurologic Exam     Mental Status   Oriented to person, place, and time.   Registration: recalls 3 of 3 objects.   Attention: normal. Concentration: normal.   Speech: speech is normal   Level of consciousness: alert  Knowledge: good.     Cranial Nerves     CN II   Visual fields full to confrontation.   Right visual field deficit: none  Left visual field deficit: none     CN III, IV, VI   Pupils are equal, round, and reactive to light.  Extraocular motions are normal.   Right pupil: Size: 3 mm. Shape: regular. Accommodation: intact.   Left pupil: Size: 3 mm. Shape: regular. Accommodation: intact.   CN III: no CN III palsy  CN VI: no CN VI palsy  Nystagmus: none   Diplopia: none  Ophthalmoparesis: none  Upgaze: normal  Downgaze: normal  Conjugate gaze: present    CN V   Facial sensation intact.   Right facial sensation deficit: none  Left facial sensation deficit: none    CN VII   Facial expression full, symmetric.   Right facial weakness: none  Left facial weakness: none    CN VIII   CN VIII normal.     CN IX, X   CN IX normal.   CN X normal.   Palate: symmetric    CN XI   CN XI normal.   Right sternocleidomastoid strength: normal  Left sternocleidomastoid strength: normal  Right trapezius strength: normal  Left trapezius strength: normal    CN XII   CN XII normal.   Tongue deviation: none    Motor Exam   Muscle bulk: normal  Overall muscle tone: normal  Right arm tone: normal  Left arm tone: normal  Right leg tone: normal  Left leg tone: normal    Strength   Strength 5/5 throughout.     Sensory Exam   Right arm light touch: normal  Left arm light touch: normal  Right leg light touch: normal  Left leg light touch: normal  Right arm vibration: normal  Left arm vibration: normal  Right leg vibration: normal  Left leg vibration: normal  Right arm proprioception: normal  Left arm proprioception: normal  Right leg  proprioception: normal  Left leg proprioception: normal  Right arm pinprick: normal  Left arm pinprick: normal  Right leg pinprick: normal  Left leg pinprick: normal    Gait, Coordination, and Reflexes     Gait  Gait: normal    Coordination   Romberg: negative  Finger to nose coordination: normal  Heel to shin coordination: normal  Tandem walking coordination: normal    Tremor   Resting tremor: absent    Reflexes   Right brachioradialis: 2+  Left brachioradialis: 2+  Right biceps: 2+  Left biceps: 2+  Right triceps: 2+  Left triceps: 2+  Right patellar: 2+  Left patellar: 2+  Right achilles: 2+  Left achilles: 2+  Right plantar: normal  Left plantar: normal      Physical Exam   Constitutional: She is oriented to person, place, and time. She appears well-developed and well-nourished.   HENT:   Head: Normocephalic and atraumatic.   Eyes: EOM are normal. Pupils are equal, round, and reactive to light.   Neck: Normal range of motion.   Cardiovascular: Normal rate and intact distal pulses.    Pulmonary/Chest: Effort normal. No apnea. No respiratory distress.   Musculoskeletal: Normal range of motion.   Neurological: She is alert and oriented to person, place, and time. She has normal strength. She has a normal Finger-Nose-Finger Test, a normal Heel to Shin Test, a normal Romberg Test and a normal Tandem Gait Test. Gait normal.   Reflex Scores:       Tricep reflexes are 2+ on the right side and 2+ on the left side.       Bicep reflexes are 2+ on the right side and 2+ on the left side.       Brachioradialis reflexes are 2+ on the right side and 2+ on the left side.       Patellar reflexes are 2+ on the right side and 2+ on the left side.       Achilles reflexes are 2+ on the right side and 2+ on the left side.  Skin: Skin is warm and dry.   Psychiatric: She has a normal mood and affect. Her speech is normal and behavior is normal. Thought content normal.   Vitals reviewed.      Vitals:    05/23/18 1013   BP: 133/77   BP  "Location: Left arm   Patient Position: Sitting   BP Method: Large (Automatic)   Pulse: 100   Weight: 60.1 kg (132 lb 7.9 oz)   Height: 5' 3" (1.6 m)       Assessment & Plan:    Problem List Items Addressed This Visit     None      Visit Diagnoses     Confusion associated with infection    -  Primary    Relevant Orders    EEG,w/awake & drowsy record          Follow-up: Follow-up if symptoms worsen or fail to improve.   More than 50% of this 45 minute encounter was spent in counseling and coordinating care.        "

## 2018-05-29 ENCOUNTER — HOSPITAL ENCOUNTER (OUTPATIENT)
Dept: NEUROLOGY | Facility: HOSPITAL | Age: 78
Discharge: HOME | End: 2018-05-29
Attending: NEUROLOGICAL SURGERY
Payer: MEDICARE

## 2018-05-29 DIAGNOSIS — R41.0 CONFUSION ASSOCIATED WITH INFECTION: ICD-10-CM

## 2018-05-29 DIAGNOSIS — B99.9 CONFUSION ASSOCIATED WITH INFECTION: ICD-10-CM

## 2018-05-29 PROCEDURE — 95816 EEG AWAKE AND DROWSY: CPT

## 2018-05-29 PROCEDURE — 95816 PR EEG,W/AWAKE & DROWSY RECORD: ICD-10-PCS | Mod: 26,,, | Performed by: PSYCHIATRY & NEUROLOGY

## 2018-05-29 PROCEDURE — 95816 EEG AWAKE AND DROWSY: CPT | Mod: 26,,, | Performed by: PSYCHIATRY & NEUROLOGY

## 2018-06-07 ENCOUNTER — TELEPHONE (OUTPATIENT)
Dept: NEUROLOGY | Facility: CLINIC | Age: 78
End: 2018-06-07

## 2018-06-07 NOTE — TELEPHONE ENCOUNTER
----- Message from Zenaida Sofia sent at 6/7/2018 10:33 AM CDT -----  Contact: Self  Pt requesting EEg lab results. Please call a [phone].        Sent to Dr. Vargas.

## 2018-06-08 ENCOUNTER — OFFICE VISIT (OUTPATIENT)
Dept: PODIATRY | Facility: CLINIC | Age: 78
End: 2018-06-08
Payer: MEDICARE

## 2018-06-08 VITALS
DIASTOLIC BLOOD PRESSURE: 90 MMHG | WEIGHT: 132.5 LBS | BODY MASS INDEX: 23.48 KG/M2 | SYSTOLIC BLOOD PRESSURE: 140 MMHG | HEIGHT: 63 IN

## 2018-06-08 DIAGNOSIS — R20.0 NUMBNESS OF TOES: ICD-10-CM

## 2018-06-08 DIAGNOSIS — T45.1X5A CHEMOTHERAPY-INDUCED PERIPHERAL NEUROPATHY: Primary | ICD-10-CM

## 2018-06-08 DIAGNOSIS — G62.0 CHEMOTHERAPY-INDUCED PERIPHERAL NEUROPATHY: Primary | ICD-10-CM

## 2018-06-08 PROCEDURE — 99999 PR PBB SHADOW E&M-EST. PATIENT-LVL III: CPT | Mod: PBBFAC,,, | Performed by: PODIATRIST

## 2018-06-08 PROCEDURE — 99213 OFFICE O/P EST LOW 20 MIN: CPT | Mod: PBBFAC,PO | Performed by: PODIATRIST

## 2018-06-08 PROCEDURE — 99203 OFFICE O/P NEW LOW 30 MIN: CPT | Mod: S$PBB,,, | Performed by: PODIATRIST

## 2018-06-08 RX ORDER — CALCIUM CARBONATE 600 MG
600 TABLET ORAL 2 TIMES DAILY WITH MEALS
COMMUNITY

## 2018-06-08 RX ORDER — TIZANIDINE 4 MG/1
4 TABLET ORAL EVERY 6 HOURS PRN
COMMUNITY
End: 2018-08-20 | Stop reason: SDUPTHER

## 2018-06-08 RX ORDER — FERROUS SULFATE 324(65)MG
325 TABLET, DELAYED RELEASE (ENTERIC COATED) ORAL DAILY
COMMUNITY

## 2018-06-08 NOTE — PATIENT INSTRUCTIONS
Recommend lotions: eucerin, eucerin for diabetics, aquaphor, A&D ointment, gold bond for diabetics, sween, [place]'s Bees all purpose baby ointment,  urea 40 with aloe (found on amazon.com)    Shoe recommendations: (try 6pm.com, zappos.com , nordstromrack.[employer]ia Software, or shoes.[employer] Software for discounted prices) you can visit DSW shoes in [place]  or [facility] in the [facility] (there are also several shoe brand outlets in the [facility])    Asics (GT 2000 or gel foundations), new balance stability type shoes, saucony (stabil c3),  Leary (GTS or Beast or transcend), vionic, propet (tennis shoe)    sofft brand, clarks, crocs, aerosoles, naturalizers, SAS, ecco, born, pratima marc, rockports (dress shoes)    Vionic, burkenstocks, fitflops, propet (sandals)  Nike comfort thong sandals, crocs, propet (house shoes)    Nail Home remedy:  Vicks Vapor rub to nails for easier managability    Occasional soaks for 15-20 mins in luke warm water with 1 cup of listerine and 1 cup of apple cider vinegar are ok You may add several drops of oil of oregano or tea tree oil as well        Diabetes: Inspecting Your Feet  Diabetes increases your chances of developing foot problems. So inspect your feet every day. This helps you find small skin irritations before they become serious infections. If you have trouble seeing the bottoms of your feet, use a mirror or ask a family member or friend to help.     Pressure spots on the bottom of the foot are common areas where problems develop.   How to check your feet  Below are tips to help you look for foot problems. Try to check your feet at the same time each day, such as when you get out of bed in the morning:  · Check the top of each foot. The tops of toes, back of the heel, and outer edge of the foot can get a lot of rubbing from poor-fitting shoes.  · Check the bottom of each foot. Daily wear and tear often leads to problems at pressure spots.  · Check the toes and nails. Fungal infections often occur  between toes. Toenail problems can also be a sign of fungal infections or lead to breaks in the skin.  · Check your shoes, too. Loose objects inside a shoe can injure the foot. Use your hand to feel inside your shoes for things like gregory, loose stitching, or rough areas that could irritate your skin.  Warning signs  Look for any color changes in the foot. Redness with streaks can signal a severe infection, which needs immediate medical attention. Tell your doctor right away if you have any of these problems:  · Swelling, sometimes with color changes, may be a sign of poor blood flow or infection. Symptoms include tenderness and an increase in the size of your foot.  · Warm or hot areas on your feet may be signs of infection. A foot that is cold may not be getting enough blood.  · Sensations such as burning, tingling, or pins and needles can be signs of a problem. Also check for areas that may be numb.  · Hot spots are caused by friction or pressure. Look for hot spots in areas that get a lot of rubbing. Hot spots can turn into blisters, calluses, or sores.  · Cracks and sores are caused by dry or irritated skin. They are a sign that the skin is breaking down, which can lead to infection.  · Toenail problems to watch for include nails growing into the skin (ingrown toenail) and causing redness or pain. Thick, yellow, or discolored nails can signal a fungal infection.  · Drainage and odor can develop from untreated sores and ulcers. Call your doctor right away if you notice white or yellow drainage, bleeding, or unpleasant odor.   © 9041-6356 [employer]. [address] [place] [address]. All rights reserved. This information is not intended as a substitute for professional medical care. Always follow your healthcare professional's instructions.        Step-by-Step:  Inspecting Your Feet (Diabetes)    Date Last Reviewed: 10/1/2016  © 6164-7939 [employer]. [address] [place] [address]  Road, [place] [address]. All rights reserved. This information is not intended as a substitute for professional medical care. Always follow your healthcare professional's instructions.

## 2018-06-08 NOTE — LETTER
June 8, 2018      Ed Griffin, NP  [address]  [place] [address]           [facility]  [address]  [place] [address]  Phone: [phone]          Patient: Leena Christina   MR Number: [id number]   YOB: 1940   Date of Visit: 6/8/2018       Dear Ed Griffin:    Thank you for referring Leena Christina to me for evaluation. Attached you will find relevant portions of my assessment and plan of care.    If you have questions, please do not hesitate to call me. I look forward to following Leena Christina along with you.    Sincerely,    Francoise Hall DPM    Enclosure  CC:  No Recipients    If you would like to receive this communication electronically, please contact [email or web address] or [phone] to request more information on [employer] Link access.    For providers and/or their staff who would like to refer a patient to [facility], please contact us through our one-stop-shop provider referral line, [facility], at [phone].    If you feel you have received this communication in error or would no longer like to receive these types of communications, please e-mail [email or web address]

## 2018-06-17 ENCOUNTER — PATIENT MESSAGE (OUTPATIENT)
Dept: FAMILY MEDICINE | Facility: CLINIC | Age: 78
End: 2018-06-17

## 2018-06-18 ENCOUNTER — TELEPHONE (OUTPATIENT)
Dept: FAMILY MEDICINE | Facility: CLINIC | Age: 78
End: 2018-06-18

## 2018-06-18 RX ORDER — PREDNISONE 20 MG/1
20 TABLET ORAL DAILY
Qty: 10 TABLET | Refills: 0 | Status: SHIPPED | OUTPATIENT
Start: 2018-06-18 | End: 2018-06-28

## 2018-06-19 ENCOUNTER — PATIENT MESSAGE (OUTPATIENT)
Dept: FAMILY MEDICINE | Facility: CLINIC | Age: 78
End: 2018-06-19

## 2018-06-20 NOTE — PROCEDURES
DATE OF PROCEDURE:  05/29/2018.    EEG NUMBER:  [id number].    ELECTROENCEPHALOGRAM REPORT    METHODOLOGY:  Electroencephalographic (EEG) recording is recorded with   electrodes placed according to the International 10-20 placement system.  Thirty   two (32) channels of digital signal (sampling rate of 512/sec), including T1   and T2, were simultaneously recorded from the scalp and may include EKG, EMG,   and/or eye monitors.  Recording band pass was 0.1 to 512 Hz.  Digital video   recording of the patient is simultaneously recorded with the EEG.  The patient   is instructed to report clinical symptoms which may occur during the recording   session.  EEG and video recording are stored and archived in digital format.    Activation procedures, which include photic stimulation, hyperventilation and   instructing patients to perform simple tasks, are done in selected patients  The EEG is displayed on a monitor screen and can be reviewed using different   montages.  Computer assisted-analysis is employed to detect spike and   electrographic seizure activity.   The entire record is submitted for computer   analysis.  The entire recording is visually reviewed, and the times identified   by computer analysis as being spikes or seizures are reviewed again.    Compressed spectral analysis (CSA) is also performed on the activity recorded   from each individual channel.  This is displayed as a power display of   frequencies from 0 to 30 Hz over time.   The CSA is reviewed looking for   asymmetries in power between homologous areas of the scalp, then compared with   the original EEG recording.  [employer] software was also utilized in the review of this study.  This software   suite analyzes the EEG recording in multiple domains.  Coherence and rhythmicity   are computed to identify EEG sections which may contain organized seizures.    Each channel undergoes analysis to detect the presence of spike and sharp waves   which have  special and morphological characteristics of epileptic activity.  The   routine EEG recording is converted from special into frequency domain.  This is   then displayed comparing homologous areas to identify areas of significant   asymmetry.  Algorithm to identify non-cortically generated artifact is used to   separate artifact from the EEG.  EEG FINDINGS:  The patient's background consists of a posteriorly dominant alpha   rhythm with frequency of approximately 10 Hz, which is reasonably well formed,   reasonably well modulated and abolishes with eye opening.  Anteriorly, the   patient's background consists mainly of beta range frequencies.  There is no   focal slowing.  There are no focal, lateralized or epileptiform transients.  No   electrographic seizures were seen.  At various times during the record, the   patient is noted to be in the awake and drowsy states.  Normal sleep   architecture is not appreciated.  Hyperventilation was not performed.  Photic   stimulation did not induce any pathologic changes in the patient's EEG.  The   patient's EKG demonstrated sinus rhythm.    INTERPRETATION:  This is a normal EEG in an awake and drowsy adult.  Please be   aware that a normal EEG does not exclude the possibility of an underlying   seizure disorder.      TG/IN  dd: 06/20/2018 09:37:22 (CDT)  td: 06/20/2018 15:19:31 (CDT)  Doc ID   #[id number]  Job ID #[id number]    CC:

## 2018-07-02 ENCOUNTER — PATIENT MESSAGE (OUTPATIENT)
Dept: FAMILY MEDICINE | Facility: CLINIC | Age: 78
End: 2018-07-02

## 2018-07-02 ENCOUNTER — TELEPHONE (OUTPATIENT)
Dept: FAMILY MEDICINE | Facility: CLINIC | Age: 78
End: 2018-07-02

## 2018-07-02 DIAGNOSIS — M54.31 RIGHT SIDED SCIATICA: Primary | ICD-10-CM

## 2018-07-02 RX ORDER — TIZANIDINE 4 MG/1
4 TABLET ORAL EVERY 8 HOURS
Qty: 30 TABLET | Refills: 0 | Status: SHIPPED | OUTPATIENT
Start: 2018-07-02 | End: 2018-07-12

## 2018-07-03 ENCOUNTER — TELEPHONE (OUTPATIENT)
Dept: FAMILY MEDICINE | Facility: CLINIC | Age: 78
End: 2018-07-03

## 2018-07-03 DIAGNOSIS — M54.31 RIGHT SIDED SCIATICA: ICD-10-CM

## 2018-07-03 NOTE — TELEPHONE ENCOUNTER
----- Message from Leena Linette sent at 7/2/2018  4:27 PM CDT -----  Contact: [facility]   Pt calling to request a 90 day prescription for --- tiZANidine (ZANAFLEX) 4 MG tablet--- instead of 30.  Please call back at [phone].

## 2018-07-09 ENCOUNTER — OFFICE VISIT (OUTPATIENT)
Dept: NEUROLOGY | Facility: CLINIC | Age: 78
End: 2018-07-09
Payer: MEDICARE

## 2018-07-09 VITALS
BODY MASS INDEX: 23.17 KG/M2 | DIASTOLIC BLOOD PRESSURE: 84 MMHG | WEIGHT: 130.75 LBS | SYSTOLIC BLOOD PRESSURE: 151 MMHG | HEIGHT: 63 IN | HEART RATE: 108 BPM

## 2018-07-09 DIAGNOSIS — B99.9 CONFUSION ASSOCIATED WITH INFECTION: Primary | ICD-10-CM

## 2018-07-09 DIAGNOSIS — R41.0 CONFUSION ASSOCIATED WITH INFECTION: Primary | ICD-10-CM

## 2018-07-09 PROCEDURE — 99214 OFFICE O/P EST MOD 30 MIN: CPT | Mod: S$PBB,,, | Performed by: NEUROLOGICAL SURGERY

## 2018-07-09 PROCEDURE — 99999 PR PBB SHADOW E&M-EST. PATIENT-LVL II: CPT | Mod: PBBFAC,,, | Performed by: NEUROLOGICAL SURGERY

## 2018-07-09 PROCEDURE — 99212 OFFICE O/P EST SF 10 MIN: CPT | Mod: PBBFAC,PO | Performed by: NEUROLOGICAL SURGERY

## 2018-07-09 NOTE — PROGRESS NOTES
Chief Complaint   Patient presents with    Follow-up     EEG results         Leena Christina is a 78 y.o. female with a history of multiple medical diagnoses as listed below that presents for evaluation of an episode of visual hallucinations and nystagmus that was noted on clinic visit.  The patient had been having these symptoms while she was hospitalized for a urinary tract infection.  Patient went to urgent care and was started on a course of Bactrim for a UTI.  The patient had a significant temperature elevation was noted at that time.  At some point during the night the patient endured a fall and called a friend.  The patient is unaware of the phone call, but the friend says that she remembers conversing with the patient and the patient telling her that she did not feel well, so she wanted her to call her periodically to check up on her.  During the course of this night the patient said that she was having hallucinations that she recalls is seen snakes and bugs in the bed with her.  She said that during the fall she may have hit her head but does not feel like she lost consciousness.  The phone call that the friend recollected other healthcare providers was that Ms. Stern called her to sit is somewhat was in her house and she needed her to call emergency services.  The patient did not in fact have anyone in her home the night, but she does not really remember this interaction.  She has never had any symptoms like this in the past.  She has not had any further symptoms since she cleared her UTI.  She denies any dizziness, lightheadedness, difficulty with concentration and focus, difficulty with sleeping.  She has no speech problems.    Interval History  07/09/2018  She has not had any further periods of confusion or hallucinations in the time she was last seen. She has felt that she is back to her baseline. EEG was done in the time since her previous clinic visit.    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     Social History Main Topics    Smoking status: Never Smoker    Smokeless tobacco: Never Used    Alcohol use Not on file    Drug use: Unknown    Sexual activity: Not on file     Other Topics Concern    Not on file     Social History Narrative    No narrative on file       FAMILY HISTORY:  Family History   Problem Relation Age of Onset    Breast cancer Other        ALLERGIES AND MEDICATIONS: updated and reviewed.  Review of patient's allergies indicates:   Allergen Reactions    Dilaudid [hydromorphone] Other (See Comments)     hallucinations     Current Outpatient Prescriptions   Medication Sig Dispense Refill    aspirin (ECOTRIN) 81 MG EC tablet Take 81 mg by mouth once daily.        atorvastatin (LIPITOR) 40 MG tablet Take 1 tablet (40 mg total) by mouth once daily. 90 tablet 90    B INFANTIS/B ANI/B YARELY/B BIFID (PROBIOTIC 4X ORAL) Take by mouth.      blood sugar diagnostic Strp TEST 1 TIMES DAILY 100 strip 12    blood-glucose meter kit Use as instructed 1 each 0    calcium carbonate (OS-TAWANDA) 600 mg calcium (1,500 mg) Tab Take 600 mg by mouth 2 (two) times daily with meals.      celecoxib (CELEBREX) 200 MG capsule TAKE 1 CAPSULE EVERY OTHER DAY 90 capsule 3    ferrous sulfate 324 mg (65 mg iron)  TbEC Take 325 mg by mouth once daily.      fish oil-omega-3 fatty acids 300-1,000 mg capsule Take 2 g by mouth once daily.        glucosamine-chondroitin 500-400 mg tablet Take 1 tablet by mouth 2 (two) times daily.        hydroCHLOROthiazide (HYDRODIURIL) 25 MG tablet Take 1 tablet (25 mg total) by mouth once daily. 90 tablet 3    lactobacillus combination no.4 (SENIOR PROBIOTIC ORAL) Take by mouth once daily.      lancets (FREESTYLE LANCETS) 28 gauge Misc 1 lancet by Misc.(Non-Drug; Combo Route) route 3 (three) times daily. 200 each 3    metFORMIN (GLUCOPHAGE) 500 MG tablet TAKE 1 TABLET TWICE A DAY WITH MEALS 180 tablet 2    NEXIUM 40 mg capsule TAKE 1 CAPSULE DAILY 90 capsule 11    pramipexole 0.375 mg Tb24 Take 0.375 mg by mouth once daily.      ramipril (ALTACE) 5 MG capsule TAKE 1 CAPSULE DAILY 90 capsule 0    rosuvastatin (CRESTOR) 10 MG tablet Take 10 mg by mouth once daily.      sertraline (ZOLOFT) 100 MG tablet Take 2 tablets (200 mg total) by mouth once daily. 180 tablet 12    tiZANidine (ZANAFLEX) 4 MG tablet Take 4 mg by mouth every 6 (six) hours as needed.      tiZANidine (ZANAFLEX) 4 MG tablet Take 1 tablet (4 mg total) by mouth every 8 (eight) hours. for 10 days 30 tablet 0    valsartan (DIOVAN) 160 MG tablet Take 1 tablet (160 mg total) by mouth once daily. 90 tablet 12    ZEJULA 100 mg Cap Take 200 mg by mouth every evening.       zolpidem (AMBIEN) 5 MG Tab TAKE 1 TO 2 TABLETS BY MOUTH AT BEDTIME 45 tablet 5     No current facility-administered medications for this visit.        Review of Systems   Constitutional: Negative for activity change, appetite change, fever and unexpected weight change.   HENT: Negative for trouble swallowing and voice change.    Eyes: Negative for photophobia and visual disturbance.   Respiratory: Negative for apnea and shortness of breath.    Cardiovascular: Negative for chest pain and leg swelling.   Gastrointestinal: Negative for constipation and  nausea.   Genitourinary: Negative for difficulty urinating.   Musculoskeletal: Negative for back pain, gait problem and neck pain.   Skin: Negative for color change and pallor.   Neurological: Negative for dizziness, seizures, syncope, weakness and numbness.   Hematological: Negative for adenopathy.   Psychiatric/Behavioral: Negative for agitation, confusion and decreased concentration.       Neurologic Exam     Mental Status   Oriented to person, place, and time.   Registration: recalls 3 of 3 objects.   Attention: normal. Concentration: normal.   Speech: speech is normal   Level of consciousness: alert  Knowledge: good.     Cranial Nerves     CN II   Visual fields full to confrontation.   Right visual field deficit: none  Left visual field deficit: none     CN III, IV, VI   Pupils are equal, round, and reactive to light.  Extraocular motions are normal.   Right pupil: Size: 3 mm. Shape: regular. Accommodation: intact.   Left pupil: Size: 3 mm. Shape: regular. Accommodation: intact.   CN III: no CN III palsy  CN VI: no CN VI palsy  Nystagmus: none   Diplopia: none  Ophthalmoparesis: none  Upgaze: normal  Downgaze: normal  Conjugate gaze: present    CN V   Facial sensation intact.   Right facial sensation deficit: none  Left facial sensation deficit: none    CN VII   Facial expression full, symmetric.   Right facial weakness: none  Left facial weakness: none    CN VIII   CN VIII normal.     CN IX, X   CN IX normal.   CN X normal.   Palate: symmetric    CN XI   CN XI normal.   Right sternocleidomastoid strength: normal  Left sternocleidomastoid strength: normal  Right trapezius strength: normal  Left trapezius strength: normal    CN XII   CN XII normal.   Tongue deviation: none    Motor Exam   Muscle bulk: normal  Overall muscle tone: normal  Right arm tone: normal  Left arm tone: normal  Right leg tone: normal  Left leg tone: normal    Strength   Strength 5/5 throughout.     Sensory Exam   Right arm light touch:  normal  Left arm light touch: normal  Right leg light touch: normal  Left leg light touch: normal  Right arm vibration: normal  Left arm vibration: normal  Right leg vibration: normal  Left leg vibration: normal  Right arm proprioception: normal  Left arm proprioception: normal  Right leg proprioception: normal  Left leg proprioception: normal  Right arm pinprick: normal  Left arm pinprick: normal  Right leg pinprick: normal  Left leg pinprick: normal    Gait, Coordination, and Reflexes     Gait  Gait: normal    Coordination   Romberg: negative  Finger to nose coordination: normal  Heel to shin coordination: normal  Tandem walking coordination: normal    Tremor   Resting tremor: absent    Reflexes   Right brachioradialis: 2+  Left brachioradialis: 2+  Right biceps: 2+  Left biceps: 2+  Right triceps: 2+  Left triceps: 2+  Right patellar: 2+  Left patellar: 2+  Right achilles: 2+  Left achilles: 2+  Right plantar: normal  Left plantar: normal      Physical Exam   Constitutional: She is oriented to person, place, and time. She appears well-developed and well-nourished.   HENT:   Head: Normocephalic and atraumatic.   Eyes: EOM are normal. Pupils are equal, round, and reactive to light.   Neck: Normal range of motion.   Cardiovascular: Normal rate and intact distal pulses.    Pulmonary/Chest: Effort normal. No apnea. No respiratory distress.   Musculoskeletal: Normal range of motion.   Neurological: She is alert and oriented to person, place, and time. She has normal strength. She has a normal Finger-Nose-Finger Test, a normal Heel to Shin Test, a normal Romberg Test and a normal Tandem Gait Test. Gait normal.   Reflex Scores:       Tricep reflexes are 2+ on the right side and 2+ on the left side.       Bicep reflexes are 2+ on the right side and 2+ on the left side.       Brachioradialis reflexes are 2+ on the right side and 2+ on the left side.       Patellar reflexes are 2+ on the right side and 2+ on the left  "side.       Achilles reflexes are 2+ on the right side and 2+ on the left side.  Skin: Skin is warm and dry.   Psychiatric: She has a normal mood and affect. Her speech is normal and behavior is normal. Thought content normal.   Vitals reviewed.      Vitals:    07/09/18 0949   BP: (!) 151/84   BP Location: Left arm   Patient Position: Sitting   BP Method: Large (Automatic)   Pulse: 108   Weight: 59.3 kg (130 lb 11.7 oz)   Height: 5' 3" (1.6 m)       Assessment & Plan:    Problem List Items Addressed This Visit     Confusion associated with infection - Primary          Follow-up: Follow-up in about 6 months (around 1/9/2019).   More than 50% of this 25 minute encounter was spent in counseling and coordinating care.        "

## 2018-07-11 ENCOUNTER — TELEPHONE (OUTPATIENT)
Dept: ADMINISTRATIVE | Facility: HOSPITAL | Age: 78
End: 2018-07-11

## 2018-07-11 NOTE — TELEPHONE ENCOUNTER
Received from Dr Isabella Champagne Diabetic Eye Exam Report.  Updated health maintenance and scanned into chart.

## 2018-07-30 ENCOUNTER — PATIENT MESSAGE (OUTPATIENT)
Dept: FAMILY MEDICINE | Facility: CLINIC | Age: 78
End: 2018-07-30

## 2018-07-30 RX ORDER — RAMIPRIL 5 MG/1
5 CAPSULE ORAL DAILY
Qty: 90 CAPSULE | Refills: 4 | Status: SHIPPED | OUTPATIENT
Start: 2018-07-30

## 2018-07-30 RX ORDER — IRBESARTAN 150 MG/1
150 TABLET ORAL NIGHTLY
Qty: 90 TABLET | Refills: 3 | Status: SHIPPED | OUTPATIENT
Start: 2018-07-30 | End: 2019-01-29

## 2018-07-30 NOTE — TELEPHONE ENCOUNTER
Information about the change Valsartan was nearly lost as I was improving the other blood pressure medications sent.      Please do not associate any messages with a refill request sent to the refill basket.  Thanks.  Do not want to miss anything    Okay to inform patient that we change the valsartan but also that last appointment with Dr. FAM was about a year ago this August so plan on making an appointment to review blood pressure and so forth

## 2018-08-07 NOTE — TELEPHONE ENCOUNTER
----- Message from Leena moisesshaquillegracie sent at 8/7/2018  9:10 AM CDT -----  Contact: June with [facility]   Renewal request for---sertraline (ZOLOFT) 100 MG tablet--- [phone].  Ref #   [id number].

## 2018-08-08 RX ORDER — SERTRALINE HYDROCHLORIDE 100 MG/1
200 TABLET, FILM COATED ORAL DAILY
Qty: 180 TABLET | Refills: 12 | Status: SHIPPED | OUTPATIENT
Start: 2018-08-08 | End: 2019-07-10 | Stop reason: SDUPTHER

## 2018-08-20 ENCOUNTER — OFFICE VISIT (OUTPATIENT)
Dept: FAMILY MEDICINE | Facility: CLINIC | Age: 78
End: 2018-08-20
Payer: MEDICARE

## 2018-08-20 VITALS
HEIGHT: 63 IN | BODY MASS INDEX: 22.89 KG/M2 | DIASTOLIC BLOOD PRESSURE: 62 MMHG | TEMPERATURE: 98 F | HEART RATE: 88 BPM | SYSTOLIC BLOOD PRESSURE: 132 MMHG | OXYGEN SATURATION: 98 % | WEIGHT: 129.19 LBS

## 2018-08-20 DIAGNOSIS — M54.30 SCIATICA, UNSPECIFIED LATERALITY: ICD-10-CM

## 2018-08-20 DIAGNOSIS — M25.561 ACUTE PAIN OF RIGHT KNEE: Primary | ICD-10-CM

## 2018-08-20 PROCEDURE — 99215 OFFICE O/P EST HI 40 MIN: CPT | Mod: PBBFAC,PO | Performed by: NURSE PRACTITIONER

## 2018-08-20 PROCEDURE — 99214 OFFICE O/P EST MOD 30 MIN: CPT | Mod: S$PBB,,, | Performed by: NURSE PRACTITIONER

## 2018-08-20 PROCEDURE — 99999 PR PBB SHADOW E&M-EST. PATIENT-LVL V: CPT | Mod: PBBFAC,,, | Performed by: NURSE PRACTITIONER

## 2018-08-20 RX ORDER — TIZANIDINE 4 MG/1
4 TABLET ORAL EVERY 6 HOURS PRN
Qty: 45 TABLET | Refills: 0 | Status: SHIPPED | OUTPATIENT
Start: 2018-08-20 | End: 2018-08-20 | Stop reason: SDUPTHER

## 2018-08-20 RX ORDER — TIZANIDINE 4 MG/1
TABLET ORAL
Qty: 368 TABLET | Refills: 0 | Status: SHIPPED | OUTPATIENT
Start: 2018-08-20 | End: 2019-02-04 | Stop reason: SDUPTHER

## 2018-08-20 NOTE — PROGRESS NOTES
This dictation has been generated using Modal Fluency Dictation some phonetic errors may occur. Please contact author for clarification if needed.     Problem List Items Addressed This Visit     None      Visit Diagnoses     Acute pain of right knee    -  Primary    arthritis and meniscus involvement.     Sciatica, unspecified laterality              Orders Placed This Encounter    tiZANidine (ZANAFLEX) 4 MG tablet     Right knee pain. Intermittent sciatica.  Refill tizanidine.  Likely arthritis and small meniscus tear in the right knee.  Patient would like to defer therapy and Ortho at this time.  Endometrial cancer with metastasis.  Currently with some urinary obstruction and following with Dr. Rowe in Urology.  I have asked for her advanced directives.  In excessive of 25 minutes spent with patient with greater than 50% of time dedicated to education on symptoms, diagnosis, treatments, and coordination of care.     Follow-up if symptoms worsen or fail to improve.    ________________________________________________________________  ________________________________________________________________      Chief Complaint   Patient presents with    Leg Pain    Knee Pain     History of present illness  This 78 y.o. presents today for complaint of knee pain and leg pain.  Symptoms have been present for 8 days.  Denies specific history of injury.  So in my partners previously for a left-sided sciatica.  Those issues are resolving.  She does ask for refill of muscle relaxer tizanidine which was helpful.  Patient denies new trauma to knee.  Review of systems  No fever or chills  No change in bowel or bladder habits  Knee occasionally gives out  Answers for HPI/ROS submitted by the patient on 8/20/2018   activity change: No  unexpected weight change: No  neck pain: No  hearing loss: No  rhinorrhea: No  trouble swallowing: No  eye discharge: No  visual disturbance: No  chest tightness: No  wheezing: No  chest pain:  No  palpitations: No  blood in stool: No  constipation: No  vomiting: No  diarrhea: No  polydipsia: No  polyuria: No  difficulty urinating: No  hematuria: No  menstrual problem: No  dysuria: No  joint swelling: Yes  arthralgias: Yes  headaches: No  weakness: No  confusion: No  dysphoric mood: No    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    HTN (hypertension) 1/24/2013    Hyperlipidemia 1/24/2013    Insomnia 7/18/2014    Ovarian cancer 1/24/2013       Past Surgical History:   Procedure Laterality Date    BREAST CYST EXCISION      HYSTERECTOMY      OOPHORECTOMY         Family History   Problem Relation Age of Onset    Breast cancer Other        Social History     Socioeconomic History    Marital status:      Spouse name: None    Number of children: None    Years of education: None    Highest education level: None   Social Needs    Financial resource strain: None    Food insecurity - worry: None    Food insecurity - inability: None    Transportation needs - medical: None    Transportation needs - non-medical: None   Occupational History    None   Tobacco Use    Smoking status: Never Smoker    Smokeless tobacco: Never Used   Substance and Sexual Activity    Alcohol use: None    Drug use: None    Sexual activity: None   Other Topics Concern    None   Social History Narrative    None       Current Outpatient Medications   Medication Sig Dispense Refill    aspirin (ECOTRIN) 81 MG EC tablet Take 81 mg by mouth once daily.        atorvastatin (LIPITOR) 40 MG tablet Take 1 tablet (40 mg total) by mouth once daily. 90 tablet 90    blood sugar diagnostic Strp TEST 1 TIMES DAILY 100 strip 12    blood-glucose meter kit Use as instructed 1 each 0    calcium carbonate  (OS-TAWANDA) 600 mg calcium (1,500 mg) Tab Take 600 mg by mouth 2 (two) times daily with meals.      celecoxib (CELEBREX) 200 MG capsule TAKE 1 CAPSULE EVERY OTHER DAY 90 capsule 3    ferrous sulfate 324 mg (65 mg iron) TbEC Take 325 mg by mouth once daily.      fish oil-omega-3 fatty acids 300-1,000 mg capsule Take 2 g by mouth once daily.        glucosamine-chondroitin 500-400 mg tablet Take 1 tablet by mouth 2 (two) times daily.        hydroCHLOROthiazide (HYDRODIURIL) 25 MG tablet Take 1 tablet (25 mg total) by mouth once daily. 90 tablet 3    irbesartan (AVAPRO) 150 MG tablet Take 1 tablet (150 mg total) by mouth every evening. 90 tablet 3    lactobacillus combination no.4 (SENIOR PROBIOTIC ORAL) Take by mouth once daily.      lancets (FREESTYLE LANCETS) 28 gauge Misc 1 lancet by Misc.(Non-Drug; Combo Route) route 3 (three) times daily. 200 each 3    metFORMIN (GLUCOPHAGE) 500 MG tablet TAKE 1 TABLET TWICE A DAY WITH MEALS 180 tablet 2    NEXIUM 40 mg capsule TAKE 1 CAPSULE DAILY 90 capsule 11    pramipexole 0.375 mg Tb24 Take 0.375 mg by mouth once daily.      ramipril (ALTACE) 5 MG capsule Take 1 capsule (5 mg total) by mouth once daily. 90 capsule 4    rosuvastatin (CRESTOR) 10 MG tablet Take 10 mg by mouth once daily.      sertraline (ZOLOFT) 100 MG tablet Take 2 tablets (200 mg total) by mouth once daily. 180 tablet 12    tiZANidine (ZANAFLEX) 4 MG tablet Take 1 tablet (4 mg total) by mouth every 6 (six) hours as needed. 45 tablet 0    valsartan (DIOVAN) 160 MG tablet Take 1 tablet (160 mg total) by mouth once daily. 90 tablet 12    ZEJULA 100 mg Cap Take 200 mg by mouth every evening.       zolpidem (AMBIEN) 5 MG Tab TAKE 1 TO 2 TABLETS BY MOUTH AT BEDTIME 45 tablet 5    B INFANTIS/B ANI/B YARELY/B BIFID (PROBIOTIC 4X ORAL) Take by mouth.       No current facility-administered medications for this visit.        Review of patient's allergies indicates:   Allergen Reactions    Pyridium  [phenazopyridine] Hallucinations    Dilaudid [hydromorphone] Other (See Comments)     hallucinations       Physical examination  Vitals Reviewed  Gen. Well-dressed well-nourished   Skin warm dry and intact.  No rashes noted.  Neck is supple without adenopathy  Chest.  Respirations are even unlabored.  Lungs are clear to auscultation.  Cardiac regular rate and rhythm.  No chest wall adenopathy noted.  Neuro. Awake alert oriented x4.  Normal judgment and cognition noted.  Extremities no clubbing cyanosis or edema noted. Stable knee examination negative anterior-posterior drawer.  Some joint line tenderness medially.  Localized warmth palpable.  Mild joint effusion noted.    Call or return to clinic prn if these symptoms worsen or fail to improve as anticipated.

## 2018-09-01 ENCOUNTER — NURSE TRIAGE (OUTPATIENT)
Dept: ADMINISTRATIVE | Facility: CLINIC | Age: 78
End: 2018-09-01

## 2018-09-01 NOTE — TELEPHONE ENCOUNTER
"Was seen on 8/20 and diagnosed sciatica and torn meniscus of right knee. Now has swelling and pain to right knee.    Reason for Disposition   [1] SEVERE pain (e.g., excruciating, unable to walk) AND [2] not improved after 2 hours of pain medicine    Answer Assessment - Initial Assessment Questions  1. LOCATION: "Where is the swelling located?"  (e.g., left, right, both knees)      Around right knee  2. SIZE and DESCRIPTION: "What does the swelling look like?"  (e.g., entire knee, localized)      Warm to touch and around the entire knee  3. ONSET: "When did the swelling start?" "Does it come and go, or is it there all the time?"      Middle of august  4. PAIN: "Is there any pain?" If so, ask: "How bad is it?" (Scale 1-10; or mild, moderate, severe)      10 when sharp and shooting  5. SETTING: "Has there been any recent work, exercise or other activity that involved that part of the body?"       Was diagnosed earlier last month  6. AGGRAVATING FACTORS: "What makes the knee swelling worse?" (e.g., walking, climbing stairs, running)      Cold comprss  7. ASSOCIATED SYMPTOMS: "Is there any pain or redness?"      Warm to touch  8. OTHER SYMPTOMS: "Do you have any other symptoms?" (e.g., chest pain, difficulty breathing, fever, calf pain)      denies  9. PREGNANCY: "Is there any chance you are pregnant?" "When was your last menstrual period?"      no    Protocols used: ST KNEE SWELLING-A-      "

## 2018-09-06 ENCOUNTER — OFFICE VISIT (OUTPATIENT)
Dept: FAMILY MEDICINE | Facility: CLINIC | Age: 78
End: 2018-09-06
Payer: MEDICARE

## 2018-09-06 VITALS
DIASTOLIC BLOOD PRESSURE: 70 MMHG | HEIGHT: 63 IN | SYSTOLIC BLOOD PRESSURE: 138 MMHG | TEMPERATURE: 98 F | OXYGEN SATURATION: 97 % | HEART RATE: 88 BPM | WEIGHT: 127.44 LBS | BODY MASS INDEX: 22.58 KG/M2

## 2018-09-06 DIAGNOSIS — C56.9 OVARIAN CANCER, UNSPECIFIED LATERALITY: Primary | ICD-10-CM

## 2018-09-06 DIAGNOSIS — D69.6 THROMBOCYTOPENIA: ICD-10-CM

## 2018-09-06 DIAGNOSIS — E78.5 HYPERLIPIDEMIA, UNSPECIFIED HYPERLIPIDEMIA TYPE: ICD-10-CM

## 2018-09-06 DIAGNOSIS — E11.9 TYPE 2 DIABETES MELLITUS WITHOUT COMPLICATION, WITHOUT LONG-TERM CURRENT USE OF INSULIN: ICD-10-CM

## 2018-09-06 DIAGNOSIS — G47.00 INSOMNIA, UNSPECIFIED TYPE: ICD-10-CM

## 2018-09-06 DIAGNOSIS — N18.30 STAGE 3 CHRONIC KIDNEY DISEASE: ICD-10-CM

## 2018-09-06 DIAGNOSIS — F13.20 SEDATIVE DEPENDENCE: ICD-10-CM

## 2018-09-06 DIAGNOSIS — N13.4: ICD-10-CM

## 2018-09-06 DIAGNOSIS — N18.30 CONTROLLED TYPE 2 DIABETES MELLITUS WITH STAGE 3 CHRONIC KIDNEY DISEASE, WITHOUT LONG-TERM CURRENT USE OF INSULIN: ICD-10-CM

## 2018-09-06 DIAGNOSIS — I10 ESSENTIAL HYPERTENSION: ICD-10-CM

## 2018-09-06 DIAGNOSIS — R16.1 SPLENOMEGALY: ICD-10-CM

## 2018-09-06 DIAGNOSIS — F32.A DEPRESSION, UNSPECIFIED DEPRESSION TYPE: ICD-10-CM

## 2018-09-06 DIAGNOSIS — R19.7 DIARRHEA, UNSPECIFIED TYPE: ICD-10-CM

## 2018-09-06 DIAGNOSIS — E11.22 CONTROLLED TYPE 2 DIABETES MELLITUS WITH STAGE 3 CHRONIC KIDNEY DISEASE, WITHOUT LONG-TERM CURRENT USE OF INSULIN: ICD-10-CM

## 2018-09-06 PROCEDURE — 99999 PR PBB SHADOW E&M-EST. PATIENT-LVL III: CPT | Mod: PBBFAC,,, | Performed by: INTERNAL MEDICINE

## 2018-09-06 PROCEDURE — 99215 OFFICE O/P EST HI 40 MIN: CPT | Mod: S$PBB,,, | Performed by: INTERNAL MEDICINE

## 2018-09-06 PROCEDURE — 99213 OFFICE O/P EST LOW 20 MIN: CPT | Mod: PBBFAC,PO | Performed by: INTERNAL MEDICINE

## 2018-09-06 RX ORDER — PANTOPRAZOLE SODIUM 40 MG/1
TABLET, DELAYED RELEASE ORAL
COMMUNITY
Start: 2018-09-01 | End: 2019-01-23 | Stop reason: SDUPTHER
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
## 2018-10-05 ENCOUNTER — APPOINTMENT (OUTPATIENT)
Dept: RADIOLOGY | Facility: HOSPITAL | Age: 78
End: 2018-10-05
Attending: ORTHOPAEDIC SURGERY
Payer: MEDICARE

## 2018-10-05 ENCOUNTER — PATIENT MESSAGE (OUTPATIENT)
Dept: FAMILY MEDICINE | Facility: CLINIC | Age: 78
End: 2018-10-05

## 2018-10-05 ENCOUNTER — OFFICE VISIT (OUTPATIENT)
Dept: ORTHOPEDICS | Facility: CLINIC | Age: 78
End: 2018-10-05
Payer: MEDICARE

## 2018-10-05 VITALS — HEIGHT: 63 IN | WEIGHT: 132.94 LBS | BODY MASS INDEX: 23.55 KG/M2

## 2018-10-05 DIAGNOSIS — M25.561 CHRONIC PAIN OF RIGHT KNEE: ICD-10-CM

## 2018-10-05 DIAGNOSIS — G89.29 CHRONIC PAIN OF RIGHT KNEE: ICD-10-CM

## 2018-10-05 DIAGNOSIS — G89.29 CHRONIC PAIN OF RIGHT KNEE: Primary | ICD-10-CM

## 2018-10-05 DIAGNOSIS — M25.561 CHRONIC PAIN OF RIGHT KNEE: Primary | ICD-10-CM

## 2018-10-05 DIAGNOSIS — M25.561 ACUTE PAIN OF RIGHT KNEE: Primary | ICD-10-CM

## 2018-10-05 DIAGNOSIS — M17.11 PRIMARY OSTEOARTHRITIS OF RIGHT KNEE: ICD-10-CM

## 2018-10-05 PROCEDURE — 99213 OFFICE O/P EST LOW 20 MIN: CPT | Mod: PBBFAC,25,PN | Performed by: ORTHOPAEDIC SURGERY

## 2018-10-05 PROCEDURE — 73562 X-RAY EXAM OF KNEE 3: CPT | Mod: 26,RT,, | Performed by: RADIOLOGY

## 2018-10-05 PROCEDURE — 99999 PR PBB SHADOW E&M-EST. PATIENT-LVL III: CPT | Mod: PBBFAC,,, | Performed by: ORTHOPAEDIC SURGERY

## 2018-10-05 PROCEDURE — 73562 X-RAY EXAM OF KNEE 3: CPT | Mod: TC,FY,PN,RT

## 2018-10-05 PROCEDURE — 20610 DRAIN/INJ JOINT/BURSA W/O US: CPT | Mod: PBBFAC,PN | Performed by: ORTHOPAEDIC SURGERY

## 2018-10-05 PROCEDURE — 99204 OFFICE O/P NEW MOD 45 MIN: CPT | Mod: 25,S$PBB,, | Performed by: ORTHOPAEDIC SURGERY

## 2018-10-05 RX ORDER — TRIAMCINOLONE ACETONIDE 40 MG/ML
40 INJECTION, SUSPENSION INTRA-ARTICULAR; INTRAMUSCULAR
Status: DISCONTINUED | OUTPATIENT
Start: 2018-10-05 | End: 2018-10-05 | Stop reason: HOSPADM

## 2018-10-05 RX ADMIN — TRIAMCINOLONE ACETONIDE 40 MG: 40 INJECTION, SUSPENSION INTRA-ARTICULAR; INTRAMUSCULAR at 03:10

## 2018-10-05 NOTE — LETTER
October 5, 2018      Tayo Silveira MD  [address]  [place] [address]           [facility]  [address] [place] [address]  Phone: [phone]          Patient: Leena Christina   MR Number: [id number]   YOB: 1940   Date of Visit: 10/5/2018       Dear Dr. Tayo Silveira:    Thank you for referring Leena Christina to me for evaluation. Attached you will find relevant portions of my assessment and plan of care.    If you have questions, please do not hesitate to call me. I look forward to following Leena Christina along with you.    Sincerely,    Nign Hutchison MD    Enclosure  CC:  No Recipients    If you would like to receive this communication electronically, please contact [email or web address] or [phone] to request more information on [employer] access.    For providers and/or their staff who would like to refer a patient to [facility], please contact us through our one-stop-shop provider referral line, [facility], at [phone].    If you feel you have received this communication in error or would no longer like to receive these types of communications, please e-mail [email or web address]

## 2018-10-05 NOTE — TELEPHONE ENCOUNTER
Patient has a scheduled appointment with Dr. Hutchison, Orthopedic today 10/5/18. Patient has been advised and agree to appointment

## 2018-10-05 NOTE — TELEPHONE ENCOUNTER
Seen message, patient reports some acute right knee pain for which apparently she was diagnosed with a torn meniscus in the past.  She reports being very painful.  Perhaps she can be evaluated by Orthopedics to see if a cortisone injection we gave her some immediate relief.    I put in urgent referrals to either of the orthopedic providers we have on the [facility] and message patient in case she has some other orthopedic.      She is currently chronically battling cancer, quality of life issues are important

## 2018-10-05 NOTE — PROGRESS NOTES
CC: right knee pain      This patient was seen in consultation at the request of Tayo Silveira MD     HPI: Leena Christina is a 78 y.o. female who presents today complaining of right knee pain for 2 months that went away and flared up 2-3 days ago. Onset of pain was not associated with injury but she was standing a lot a few days ago to cook a large family meal.  The pain is constant 1/10 at best and 9/10 at its worst.  The pain is worse with motion, ambulation and better with rest. She endorses  associated radiation of pain to the leg from the hip but there is no numbness or paresthesias. There is associated swelling, popping, and giving way.  She has been treated in the past with ice, celebrex, tylenol, and zanaflex -- these do not help with the knee .  The pain does interfere with activities of daily living  and leisure activities .    She is currently being treated for metastatic endometrial cancer     This is the extent of the patient's complaints at this time.     Review of Systems   Constitutional: Negative.    HENT: Positive for congestion. Negative for hearing loss and tinnitus.    Eyes: Positive for blurred vision (macular degeneration - followed by ophtho).   Respiratory: Negative.    Cardiovascular: Negative.    Gastrointestinal: Negative.    Genitourinary:        Has issues urinating due to her cancer -- has a stent in one ureter and is scheduled to have the other stented   Musculoskeletal: Positive for back pain and joint pain.   Skin: Negative.    Neurological: Negative.    Endo/Heme/Allergies: Positive for environmental allergies.   Psychiatric/Behavioral: Positive for depression (on Zoloft).         Review of patient's allergies indicates:   Allergen Reactions    Pyridium [phenazopyridine] Hallucinations    Dilaudid [hydromorphone] Other (See Comments)     hallucinations       Current Outpatient Medications:     aspirin (ECOTRIN) 81 MG EC tablet, Take 81 mg by mouth once daily.  , Disp: ,  Rfl:     atorvastatin (LIPITOR) 40 MG tablet, Take 1 tablet (40 mg total) by mouth once daily., Disp: 90 tablet, Rfl: 90    B INFANTIS/B ANI/B YARELY/B BIFID (PROBIOTIC 4X ORAL), Take by mouth., Disp: , Rfl:     blood sugar diagnostic Strp, TEST 1 TIMES DAILY, Disp: 100 strip, Rfl: 12    blood-glucose meter kit, Use as instructed, Disp: 1 each, Rfl: 0    calcium carbonate (OS-TAWANDA) 600 mg calcium (1,500 mg) Tab, Take 600 mg by mouth 2 (two) times daily with meals., Disp: , Rfl:     celecoxib (CELEBREX) 200 MG capsule, TAKE 1 CAPSULE EVERY OTHER DAY, Disp: 90 capsule, Rfl: 3    ferrous sulfate 324 mg (65 mg iron) TbEC, Take 325 mg by mouth once daily., Disp: , Rfl:     fish oil-omega-3 fatty acids 300-1,000 mg capsule, Take 2 g by mouth once daily.  , Disp: , Rfl:     glucosamine-chondroitin 500-400 mg tablet, Take 1 tablet by mouth 2 (two) times daily.  , Disp: , Rfl:     hydroCHLOROthiazide (HYDRODIURIL) 25 MG tablet, Take 1 tablet (25 mg total) by mouth once daily., Disp: 90 tablet, Rfl: 3    irbesartan (AVAPRO) 150 MG tablet, Take 1 tablet (150 mg total) by mouth every evening., Disp: 90 tablet, Rfl: 3    lactobacillus combination no.4 (SENIOR PROBIOTIC ORAL), Take by mouth once daily., Disp: , Rfl:     lancets (FREESTYLE LANCETS) 28 gauge Misc, 1 lancet by Misc.(Non-Drug; Combo Route) route 3 (three) times daily., Disp: 200 each, Rfl: 3    metFORMIN (GLUCOPHAGE) 500 MG tablet, TAKE 1 TABLET TWICE A DAY WITH MEALS, Disp: 180 tablet, Rfl: 2    NEXIUM 40 mg capsule, TAKE 1 CAPSULE DAILY, Disp: 90 capsule, Rfl: 11    pantoprazole (PROTONIX) 40 MG tablet, , Disp: , Rfl:     pramipexole 0.375 mg Tb24, Take 0.375 mg by mouth once daily., Disp: , Rfl:     ramipril (ALTACE) 5 MG capsule, Take 1 capsule (5 mg total) by mouth once daily., Disp: 90 capsule, Rfl: 4    rosuvastatin (CRESTOR) 10 MG tablet, Take 10 mg by mouth once daily., Disp: , Rfl:     sertraline (ZOLOFT) 100 MG tablet, Take 2 tablets (200  mg total) by mouth once daily., Disp: 180 tablet, Rfl: 12    tiZANidine (ZANAFLEX) 4 MG tablet, TAKE 1 TABLET(4 MG) BY MOUTH EVERY 6 HOURS AS NEEDED, Disp: 368 tablet, Rfl: 0    ZEJULA 100 mg Cap, Take 200 mg by mouth every evening. 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        General: Weight: 60.3 kg (132 lb 15 oz) Body mass index is 23.55 kg/m².  Patient is alert, awake and oriented to time, place and person. Mood and affect are appropriate.  Patient does not appear to be in any distress, denies any constitutional symptoms and appears stated age.   HEENT: Pupils are equal and round, sclera are not injected. External examination of ears and nose reveals no abnormalities. Cranial nerves II-X are grossly intact  Skin: no rashes, abrasions or open wounds on the affected extremity   Resp: No respiratory distress or audible wheezing   CV: 2+  pulses, all extremities warm and well perfused   Right knee:   Localizes pain over medial kne   minimal effusion, no soft tissue swelling, no erythema   Active ROM: 0 - 100  No varus/valgus  deformity   Tender to palpation over medial joint line   good  quadricep muscle tone and bulk; no atrophy compared to contralateral extremity   negative Anterior drawer   negative posterior drawer   negative valgus instability   negative varus instability   Normal patellar tracking   No pain with patellar compression   ltsi s/s/sp/dp/t   + ehl/fhl/ta/gs  2 + DP  Foot WWP      Imaging: 3 views right knee:  moderate tricompartmental OA with subchondral sclerosis, joint space narrowing, osteophyte formation and loose bodies.      Assessment: 78 y.o. female with right knee arthritis    Plan:   -right knee  Injection, see procedure note for more detail  - Continue celebrex  - HEP for the knee   - Return to clinic in 3 months. Return sooner if symptoms worsen or fail to improve.    All questions were answered in detail. The patient is in full agreement with the treatment plan and will proceed accordingly.    A note notifying Tayo Silveira MD of my findings was sent via the electronic medical record

## 2018-10-15 ENCOUNTER — PATIENT MESSAGE (OUTPATIENT)
Dept: ORTHOPEDICS | Facility: CLINIC | Age: 78
End: 2018-10-15

## 2018-10-15 ENCOUNTER — TELEPHONE (OUTPATIENT)
Dept: ORTHOPEDICS | Facility: CLINIC | Age: 78
End: 2018-10-15

## 2018-10-15 NOTE — TELEPHONE ENCOUNTER
Faxed prescription for diclofenac 0.15% lidocaine 2.25%, prilocaine 2.25% topical cream -- apply up to 3.2 grams (2 pumps) to painful areas three times per day.

## 2018-10-16 ENCOUNTER — TELEPHONE (OUTPATIENT)
Dept: ORTHOPEDICS | Facility: CLINIC | Age: 78
End: 2018-10-16

## 2018-10-16 NOTE — TELEPHONE ENCOUNTER
Did call and spoken with Mrs. Christina the Pharmacy have called her to let her know that the medication will be delivered by Wednesday. Thanks

## 2018-10-21 DIAGNOSIS — I10 ESSENTIAL HYPERTENSION: ICD-10-CM

## 2018-10-21 RX ORDER — HYDROCHLOROTHIAZIDE 25 MG/1
TABLET ORAL
Qty: 90 TABLET | Refills: 3 | Status: SHIPPED | OUTPATIENT
Start: 2018-10-21 | End: 2019-09-01 | Stop reason: SDUPTHER

## 2018-11-14 ENCOUNTER — TELEPHONE (OUTPATIENT)
Dept: FAMILY MEDICINE | Facility: CLINIC | Age: 78
End: 2018-11-14

## 2018-11-14 DIAGNOSIS — Z12.31 SCREENING MAMMOGRAM, ENCOUNTER FOR: Primary | ICD-10-CM

## 2018-11-14 NOTE — TELEPHONE ENCOUNTER
----- Message from Estelita Bowden sent at 11/14/2018 11:55 AM CST -----  Contact: pt            Name of Who is Calling: pt      What is the request in detail: pt needs order for mammo. Call pt      Can the clinic reply by [facility]: no      What Number to Call Back if not in [facility]: [phone]

## 2018-11-16 ENCOUNTER — PATIENT MESSAGE (OUTPATIENT)
Dept: FAMILY MEDICINE | Facility: CLINIC | Age: 78
End: 2018-11-16

## 2018-12-04 ENCOUNTER — HOSPITAL ENCOUNTER (OUTPATIENT)
Dept: RADIOLOGY | Facility: HOSPITAL | Age: 78
Discharge: HOME | End: 2018-12-04
Attending: INTERNAL MEDICINE
Payer: MEDICARE

## 2018-12-04 DIAGNOSIS — Z12.31 SCREENING MAMMOGRAM, ENCOUNTER FOR: ICD-10-CM

## 2018-12-04 PROCEDURE — 77063 BREAST TOMOSYNTHESIS BI: CPT | Mod: 26,,, | Performed by: RADIOLOGY

## 2018-12-04 PROCEDURE — 77067 SCR MAMMO BI INCL CAD: CPT | Mod: TC,PO

## 2018-12-04 PROCEDURE — 77063 BREAST TOMOSYNTHESIS BI: CPT | Mod: TC,PO

## 2018-12-04 PROCEDURE — 77067 SCR MAMMO BI INCL CAD: CPT | Mod: 26,,, | Performed by: RADIOLOGY

## 2018-12-14 ENCOUNTER — TELEPHONE (OUTPATIENT)
Dept: FAMILY MEDICINE | Facility: CLINIC | Age: 78
End: 2018-12-14

## 2019-01-04 ENCOUNTER — PES CALL (OUTPATIENT)
Dept: ADMINISTRATIVE | Facility: CLINIC | Age: 79
End: 2019-01-04

## 2019-01-10 ENCOUNTER — PATIENT MESSAGE (OUTPATIENT)
Dept: FAMILY MEDICINE | Facility: CLINIC | Age: 79
End: 2019-01-10

## 2019-01-11 ENCOUNTER — PATIENT MESSAGE (OUTPATIENT)
Dept: FAMILY MEDICINE | Facility: CLINIC | Age: 79
End: 2019-01-11

## 2019-01-23 RX ORDER — PANTOPRAZOLE SODIUM 40 MG/1
40 TABLET, DELAYED RELEASE ORAL DAILY
Qty: 90 TABLET | Refills: 12 | Status: SHIPPED | OUTPATIENT
Start: 2019-01-23

## 2019-01-24 ENCOUNTER — TELEPHONE (OUTPATIENT)
Dept: ADMINISTRATIVE | Facility: HOSPITAL | Age: 79
End: 2019-01-24

## 2019-01-29 ENCOUNTER — PATIENT MESSAGE (OUTPATIENT)
Dept: FAMILY MEDICINE | Facility: CLINIC | Age: 79
End: 2019-01-29

## 2019-01-29 RX ORDER — IRBESARTAN 300 MG/1
300 TABLET ORAL NIGHTLY
Qty: 90 TABLET | Refills: 3 | Status: SHIPPED | OUTPATIENT
Start: 2019-01-29 | End: 2019-05-20 | Stop reason: SDUPTHER

## 2019-01-30 ENCOUNTER — PATIENT MESSAGE (OUTPATIENT)
Dept: FAMILY MEDICINE | Facility: CLINIC | Age: 79
End: 2019-01-30

## 2019-02-03 DIAGNOSIS — E78.5 HYPERLIPIDEMIA, UNSPECIFIED HYPERLIPIDEMIA TYPE: ICD-10-CM

## 2019-02-03 RX ORDER — ATORVASTATIN CALCIUM 40 MG/1
TABLET, FILM COATED ORAL
Qty: 90 TABLET | Refills: 12 | Status: SHIPPED | OUTPATIENT
Start: 2019-02-03

## 2019-02-04 RX ORDER — TIZANIDINE 4 MG/1
TABLET ORAL
Qty: 368 TABLET | Refills: 12 | Status: SHIPPED | OUTPATIENT
Start: 2019-02-04 | End: 2019-05-20 | Stop reason: SDUPTHER

## 2019-02-19 ENCOUNTER — TELEPHONE (OUTPATIENT)
Dept: FAMILY MEDICINE | Facility: CLINIC | Age: 79
End: 2019-02-19

## 2019-02-19 NOTE — TELEPHONE ENCOUNTER
----- Message from Estelita Bowden sent at 2/19/2019  9:47 AM CST -----  Contact: pt  Name of Who is Calling: pt      What is the request in detail: pt needs to bring in urine specimen. She has blood in her urine. Urine is very dark. Call pt      Can the clinic reply by [facility]: no      What Number to Call Back if not in [facility]: [phone] or [phone]

## 2019-02-20 ENCOUNTER — LAB VISIT (OUTPATIENT)
Dept: LAB | Facility: HOSPITAL | Age: 79
End: 2019-02-20
Attending: PHYSICIAN ASSISTANT
Payer: MEDICARE

## 2019-02-20 ENCOUNTER — OFFICE VISIT (OUTPATIENT)
Dept: FAMILY MEDICINE | Facility: CLINIC | Age: 79
End: 2019-02-20
Payer: MEDICARE

## 2019-02-20 VITALS
SYSTOLIC BLOOD PRESSURE: 130 MMHG | WEIGHT: 133.19 LBS | BODY MASS INDEX: 23.6 KG/M2 | TEMPERATURE: 98 F | OXYGEN SATURATION: 95 % | HEART RATE: 90 BPM | DIASTOLIC BLOOD PRESSURE: 70 MMHG | HEIGHT: 63 IN

## 2019-02-20 DIAGNOSIS — T45.1X5A CHEMOTHERAPY-INDUCED PERIPHERAL NEUROPATHY: ICD-10-CM

## 2019-02-20 DIAGNOSIS — I10 ESSENTIAL HYPERTENSION: ICD-10-CM

## 2019-02-20 DIAGNOSIS — D69.6 THROMBOCYTOPENIA: ICD-10-CM

## 2019-02-20 DIAGNOSIS — G62.0 CHEMOTHERAPY-INDUCED PERIPHERAL NEUROPATHY: ICD-10-CM

## 2019-02-20 DIAGNOSIS — N30.01 ACUTE CYSTITIS WITH HEMATURIA: Primary | ICD-10-CM

## 2019-02-20 DIAGNOSIS — F13.20 SEDATIVE DEPENDENCE: ICD-10-CM

## 2019-02-20 DIAGNOSIS — C56.9 OVARIAN CANCER, UNSPECIFIED LATERALITY: ICD-10-CM

## 2019-02-20 DIAGNOSIS — E11.22 CONTROLLED TYPE 2 DIABETES MELLITUS WITH STAGE 3 CHRONIC KIDNEY DISEASE, WITHOUT LONG-TERM CURRENT USE OF INSULIN: ICD-10-CM

## 2019-02-20 DIAGNOSIS — N18.30 CONTROLLED TYPE 2 DIABETES MELLITUS WITH STAGE 3 CHRONIC KIDNEY DISEASE, WITHOUT LONG-TERM CURRENT USE OF INSULIN: ICD-10-CM

## 2019-02-20 DIAGNOSIS — N30.01 ACUTE CYSTITIS WITH HEMATURIA: ICD-10-CM

## 2019-02-20 LAB
ASCORBIC ACID, POC: NORMAL
BILIRUB SERPL-MCNC: NORMAL MG/DL
BLOOD, POC UA: NORMAL
GLUCOSE UR QL STRIP: 100
LEUKOCYTE EST, POC UA: NORMAL
NITRITE, POC UA: NORMAL
PH, POC UA: 6
POC CREATININE URINE: NORMAL
PROTEIN, POC: NORMAL
SPECIFIC GRAVITY, POC UA: 1
UROBILINOGEN, POC UA: NORMAL

## 2019-02-20 PROCEDURE — 99214 OFFICE O/P EST MOD 30 MIN: CPT | Mod: S$PBB,,, | Performed by: PHYSICIAN ASSISTANT

## 2019-02-20 PROCEDURE — 81002 URINALYSIS NONAUTO W/O SCOPE: CPT | Mod: PBBFAC,PO | Performed by: PHYSICIAN ASSISTANT

## 2019-02-20 PROCEDURE — 99999 PR PBB SHADOW E&M-EST. PATIENT-LVL III: CPT | Mod: PBBFAC,,, | Performed by: PHYSICIAN ASSISTANT

## 2019-02-20 PROCEDURE — 99999 PR PBB SHADOW E&M-EST. PATIENT-LVL III: ICD-10-PCS | Mod: PBBFAC,,, | Performed by: PHYSICIAN ASSISTANT

## 2019-02-20 PROCEDURE — 99214 PR OFFICE/OUTPT VISIT, EST, LEVL IV, 30-39 MIN: ICD-10-PCS | Mod: S$PBB,,, | Performed by: PHYSICIAN ASSISTANT

## 2019-02-20 PROCEDURE — 87086 URINE CULTURE/COLONY COUNT: CPT

## 2019-02-20 PROCEDURE — 99213 OFFICE O/P EST LOW 20 MIN: CPT | Mod: PBBFAC,PO | Performed by: PHYSICIAN ASSISTANT

## 2019-02-20 RX ORDER — NITROFURANTOIN 25; 75 MG/1; MG/1
100 CAPSULE ORAL 2 TIMES DAILY
Qty: 10 CAPSULE | Refills: 0 | Status: SHIPPED | OUTPATIENT
Start: 2019-02-20 | End: 2019-02-25

## 2019-02-20 NOTE — PATIENT INSTRUCTIONS

## 2019-02-20 NOTE — PROGRESS NOTES
Patient Name: Leena Christina    : 1940  MRN: [id number]    Subjective:  Leena is a 78 y.o. female who presents today for:    Chief Complaint   Patient presents with    Dysuria       HPI  Patient has multiple medical diagnoses as listed below in the history. She complains of dysuria and urgency for four days. She reports the symptoms to be unchanged since onset. She reports a cloudiness to her urine. She has not taken anything for relief. She denies exacerbating factors. Associated symptoms include hematuria and frequency and suprapubic pressure. She denies fever, nausea, or flank pain.     Past Medical History  Past Medical History:   Diagnosis Date    Allergic rhinitis, seasonal 2013    Anemia 2013    Cancer     Peritoneal with lymph node involvement ?8    Diabetes mellitus type II 2013    Endometrial cancer     HDL lipoprotein deficiency 2013    HTN (hypertension) 2013    Hyperlipidemia 2013    Insomnia 2014    Ovarian cancer 2013       Past Surgical History  Past Surgical History:   Procedure Laterality Date    BREAST CYST EXCISION      HYSTERECTOMY      OOPHORECTOMY         Family History  Family History   Problem Relation Age of Onset    Breast cancer Other        Social History  Social History     Socioeconomic History    Marital status:      Spouse name: Not on file    Number of children: Not on file    Years of education: Not on file    Highest education level: Not on file   Social Needs    Financial resource strain: Not on file    Food insecurity - worry: Not on file    Food insecurity - inability: Not on file    Transportation needs - medical: Not on file    Transportation needs - non-medical: Not on file   Occupational History    Not on file   Tobacco Use    Smoking status: Never Smoker    Smokeless tobacco: Never Used   Substance and Sexual Activity    Alcohol use: Not on file    Drug use: Not on file    Sexual  activity: Not on file   Other Topics Concern    Not on file   Social History Narrative    Not on file       Current Medications  Current Outpatient Medications on File Prior to Visit   Medication Sig Dispense Refill    aspirin (ECOTRIN) 81 MG EC tablet Take 81 mg by mouth once daily.        atorvastatin (LIPITOR) 40 MG tablet TAKE 1 TABLET DAILY 90 tablet 12    B INFANTIS/B ANI/B YARELY/B BIFID (PROBIOTIC 4X ORAL) Take by mouth.      blood sugar diagnostic Strp TEST 1 TIMES DAILY 100 strip 12    blood-glucose meter kit Use as instructed 1 each 0    calcium carbonate (OS-TAWANDA) 600 mg calcium (1,500 mg) Tab Take 600 mg by mouth 2 (two) times daily with meals.      celecoxib (CELEBREX) 200 MG capsule TAKE 1 CAPSULE EVERY OTHER DAY 90 capsule 3    ferrous sulfate 324 mg (65 mg iron) TbEC Take 325 mg by mouth once daily.      fish oil-omega-3 fatty acids 300-1,000 mg capsule Take 2 g by mouth once daily.        glucosamine-chondroitin 500-400 mg tablet Take 1 tablet by mouth 2 (two) times daily.        hydroCHLOROthiazide (HYDRODIURIL) 25 MG tablet TAKE 1 TABLET DAILY 90 tablet 3    irbesartan (AVAPRO) 300 MG tablet Take 1 tablet (300 mg total) by mouth every evening. 90 tablet 3    lactobacillus combination no.4 (SENIOR PROBIOTIC ORAL) Take by mouth once daily.      lancets (FREESTYLE LANCETS) 28 gauge Misc 1 lancet by Misc.(Non-Drug; Combo Route) route 3 (three) times daily. 200 each 3    NEXIUM 40 mg capsule TAKE 1 CAPSULE DAILY 90 capsule 11    pramipexole 0.375 mg Tb24 Take 0.375 mg by mouth once daily.      ramipril (ALTACE) 5 MG capsule Take 1 capsule (5 mg total) by mouth once daily. 90 capsule 4    rosuvastatin (CRESTOR) 10 MG tablet Take 10 mg by mouth once daily.      sertraline (ZOLOFT) 100 MG tablet Take 2 tablets (200 mg total) by mouth once daily. 180 tablet 12    tiZANidine (ZANAFLEX) 4 MG tablet TAKE 1 TABLET(4 MG) BY MOUTH EVERY 6 HOURS AS NEEDED 368 tablet 12    metFORMIN  "(GLUCOPHAGE) 500 MG tablet TAKE 1 TABLET TWICE A DAY WITH MEALS 180 tablet 2    pantoprazole (PROTONIX) 40 MG tablet Take 1 tablet (40 mg total) by mouth once daily. 90 tablet 12    ZEJULA 100 mg Cap Take 200 mg by mouth every evening.       zolpidem (AMBIEN) 5 MG Tab TAKE 1 TO 2 TABLETS BY MOUTH AT BEDTIME 45 tablet 5     No current facility-administered medications on file prior to visit.        Allergies   Review of patient's allergies indicates:   Allergen Reactions    Pyridium [phenazopyridine] Hallucinations    Dilaudid [hydromorphone] Other (See Comments)     hallucinations         ROS  Review of Systems   Constitutional: Negative for chills, fatigue and fever.   Respiratory: Negative for cough, shortness of breath and wheezing.    Cardiovascular: Negative for chest pain and palpitations.   Gastrointestinal: Positive for abdominal pain. Negative for nausea and vomiting.   Endocrine: Negative for polydipsia, polyphagia and polyuria.   Genitourinary: Positive for dysuria, frequency and hematuria. Negative for difficulty urinating, flank pain, pelvic pain, urgency and vaginal discharge.   Musculoskeletal: Negative for arthralgias and myalgias.   Skin: Negative for rash.   Neurological: Negative for light-headedness and headaches.   Psychiatric/Behavioral: Negative for sleep disturbance. The patient is not nervous/anxious.          Objective:    /70   Pulse 90   Temp 98.3 °F (36.8 °C) (Oral)   Ht 5' 3" (1.6 m)   Wt 60.4 kg (133 lb 2.5 oz)   SpO2 95%   BMI 23.59 kg/m²     Physical Exam   Constitutional: She is oriented to person, place, and time. Vital signs are normal. She does not appear ill.   HENT:   Head: Normocephalic.   Eyes: Conjunctivae, EOM and lids are normal. Pupils are equal, round, and reactive to light.   Neck: Normal range of motion. Carotid bruit is not present.   Cardiovascular: Normal rate, regular rhythm, S1 normal, S2 normal and intact distal pulses. Exam reveals no gallop and " no friction rub.   No murmur heard.  Pulses:       Radial pulses are 2+ on the right side, and 2+ on the left side.        Dorsalis pedis pulses are 2+ on the right side, and 2+ on the left side.   Pulmonary/Chest: Effort normal and breath sounds normal. She has no wheezes. She has no rhonchi. She has no rales.   Abdominal: Soft. Normal appearance and bowel sounds are normal. There is tenderness in the suprapubic area. There is no CVA tenderness.   Lymphadenopathy:     She has no cervical adenopathy.   Neurological: She is alert and oriented to person, place, and time.   Skin: Skin is warm and dry. No rash noted.   Psychiatric: She has a normal mood and affect. Judgment normal.       Assessment/Plan:  Leena Christina is a 78 y.o. female who presents today for :    Leena was seen today for urinary tract infection.    Diagnoses and all orders for this visit:    Acute cystitis with hematuria  -     nitrofurantoin, macrocrystal-monohydrate, (MACROBID) 100 MG capsule; Take 1 capsule (100 mg total) by mouth 2 (two) times daily. for 5 days  -     POCT urine qual dipstick chemistries, positive nitrites and leukocyte esterase and blood  -     Urine culture; Future  Counseled regarding symptoms of urinary tract infection and rationale for antibiotic therapy  Culture obtained - await speciation/susceptibilities  Empiric therapy with Macrobid, patient reports  allergy to sulfa (rash)  Advised patient to seek urgent/emergent care if symptoms intensify  Sent patient with informational material about diagnosis  Patient gave verbal understanding and agreement of plan        Problem list issues addresses during this visit    Thrombocytopenia  Chemo induced, stable, continue current treatment plans    Controlled type 2 diabetes mellitus with stage 3 chronic kidney disease, without long-term current use of insulin  chronic and stable as reviewed in record, controlled with medication   followed by PCP  Continue current treatment  plan      Sedative dependence  chronic and stable as reviewed in record,   followed by PCP  Continue current treatment plan      Chemotherapy-induced peripheral neuropathy  Stable, followed by PCP, continue current treatment plans    HTN (hypertension)  chronic and stable as reviewed in record, controlled with medication   followed by PCP  Continue current treatment plan           Health maintenance reviewed and disussed, deferred at this time due to acute illness           Encouraged to call/return to clinic if symptoms persist or worsen    Michelle King PA-C  [facility]/ Peds

## 2019-02-21 LAB — BACTERIA UR CULT: NORMAL

## 2019-02-22 ENCOUNTER — OFFICE VISIT (OUTPATIENT)
Dept: URGENT CARE | Facility: CLINIC | Age: 79
End: 2019-02-22
Payer: MEDICARE

## 2019-02-22 VITALS
WEIGHT: 133 LBS | BODY MASS INDEX: 23.56 KG/M2 | DIASTOLIC BLOOD PRESSURE: 70 MMHG | TEMPERATURE: 98 F | OXYGEN SATURATION: 97 % | HEART RATE: 68 BPM | SYSTOLIC BLOOD PRESSURE: 130 MMHG

## 2019-02-22 DIAGNOSIS — S63.277A CLOSED DISLOCATION OF INTERPHALANGEAL JOINT OF LEFT LITTLE FINGER: Primary | ICD-10-CM

## 2019-02-22 DIAGNOSIS — M79.642 PAIN IN BOTH HANDS: ICD-10-CM

## 2019-02-22 DIAGNOSIS — M79.641 PAIN IN BOTH HANDS: ICD-10-CM

## 2019-02-22 DIAGNOSIS — S62.524A NONDISPLACED FRACTURE OF DISTAL PHALANX OF RIGHT THUMB, INITIAL ENCOUNTER FOR CLOSED FRACTURE: ICD-10-CM

## 2019-02-22 PROCEDURE — 26770 TREAT FINGER DISLOCATION: CPT | Mod: S$GLB,,, | Performed by: SURGERY

## 2019-02-22 PROCEDURE — 99214 OFFICE O/P EST MOD 30 MIN: CPT | Mod: 25,S$GLB,, | Performed by: SURGERY

## 2019-02-22 PROCEDURE — 26770 PR CLOSED RX IP JT DISLOCATION: ICD-10-PCS | Mod: S$GLB,,, | Performed by: SURGERY

## 2019-02-22 PROCEDURE — 73140 XR FINGER 2 OR MORE VIEWS LEFT: ICD-10-PCS | Mod: 59,LT,S$GLB, | Performed by: RADIOLOGY

## 2019-02-22 PROCEDURE — 73130 XR HAND COMPLETE 3 VIEW RIGHT: ICD-10-PCS | Mod: RT,S$GLB,, | Performed by: RADIOLOGY

## 2019-02-22 PROCEDURE — 73140 X-RAY EXAM OF FINGER(S): CPT | Mod: 59,LT,S$GLB, | Performed by: RADIOLOGY

## 2019-02-22 PROCEDURE — 99214 PR OFFICE/OUTPT VISIT, EST, LEVL IV, 30-39 MIN: ICD-10-PCS | Mod: 25,S$GLB,, | Performed by: SURGERY

## 2019-02-22 PROCEDURE — 73130 X-RAY EXAM OF HAND: CPT | Mod: LT,S$GLB,, | Performed by: RADIOLOGY

## 2019-02-22 PROCEDURE — 73130 XR HAND COMPLETE 3 VIEW LEFT: ICD-10-PCS | Mod: LT,S$GLB,, | Performed by: RADIOLOGY

## 2019-02-22 PROCEDURE — 73130 X-RAY EXAM OF HAND: CPT | Mod: RT,S$GLB,, | Performed by: RADIOLOGY

## 2019-02-22 NOTE — PATIENT INSTRUCTIONS
Finger Dislocation  A finger dislocation occurs when the tissues, or ligaments, that hold the joint together are torn. The bones then move apart, or are dislocated, out of their normal position. This causes pain, swelling, and bruising. Sometimes there is also a small chip fracture. Once the joint is put back into place again, it will take about 6 weeks for the ligaments to heal. During this time, you should protect your finger from re-injury.       Closed Thumb Fracture  You have a broken (fractured) thumb. This causes local pain, swelling, and often bruising. This injury will usually take about 4 to 6 weeks or longer to heal. Thumb fractures may be treated with a splint or cast. This protects the thumb and holds the bone in place while it heals. More serious fractures may need surgery.     If the thumbnail has been severely injured, it may fall off in 1 to 2 weeks. A new thumbnail will usually start to grow back within a month.  Home care  Follow these guidelines when caring for yourself at home:  · Keep your arm elevated to reduce pain and swelling. When sitting or lying down elevate your arm above the level of your heart. You can do this by placing your arm on a pillow that rests on your chest or on a pillow at your side. This is most important during the first 2 days (48 hours) after the injury.  · Put an ice pack on the injured area. Do this for 20 minutes every 1 to 2 hours the first day for pain relief. You can make an ice pack by wrapping a plastic bag of ice cubes in a thin towel. As the ice melts, be careful that the cast or splint doesnt get wet. Continue using the ice pack 3 to 4 times a day for the next 2 days. Then use the ice pack as needed to ease pain and swelling.  · If a splint was put on, leave this in place for the time advised. This will keep the bones from moving out of position.  · Keep the cast or splint completely dry at all times. Bathe with your cast or splint out of the water.  Protect it with a large plastic bag, rubber-banded at the top end. If a fiberglass cast or splint gets wet, you can dry it with a hair dryer.  · You may use acetaminophen or ibuprofen to control pain, unless another pain medicine was prescribed. If you have chronic liver or kidney disease, talk with your healthcare provider before using these medicines. Also talk with your provider if youve had a stomach ulcer or gastrointestinal bleeding.  · Dont put creams or objects under the cast if you have itching.  Follow-up care  Follow up with your healthcare provider in 1 week, or as advised. This is to make sure the bone is healing the way it should. Talk with your provider about when it is safe to return to sports or work.  X-rays may be taken. You will be told of any new findings that may affect your care.  When to seek medical advice  Call your healthcare provider right away if any of these occur:  · The cast or splint cracks  · The plaster cast or splint becomes wet or soft  · The fiberglass cast or splint stays wet for more than 24 hours  · Bad odor from the cast or wound fluid stains the cast  · Pain or swelling gets worse  · Redness or warmth in the hand  · Fingers or hand become cold, blue, numb, or tingly  · You cant move your hand or fingers  · Skin around cast or splint becomes red  · Fever of 100.4°F (38°C) or higher, or as directed by your healthcare provider  Date Last Reviewed: 2/1/2017 © 2000-2017 [employer]. [address] [place] [address]. All rights reserved. This information is not intended as a substitute for professional medical care. Always follow your healthcare professional's instructions.      Hand exercises may be prescribed at your follow-up visit. These can help speed healing and maintain function. In most cases you will regain full function of your finger. But it may take 12 to 18 months before all mild pain and swelling goes away and full function returns.  Home  care  · Keep your hand raised, or elevated, to reduce pain and swelling. When sitting or lying down, raise your arm above the level of your heart. You can do this by placing your arm on a pillow that rests on your chest. Or your arm can be on a pillow at your side. This is most important during the first 48 hours after injury.  · Put an ice pack on the injured area for no more than 15 to 20 minutes every 3 to 6 hours. Do this for the first 24 to 48 hours. You can make an ice pack by wrapping a plastic Ziploc bag of ice cubes in a thin towel. As the ice melts, be careful that the tape, gauze, or splint doesnt get wet. After that, keep using ice as needed to ease pain and swelling.  · If you have a removable splint, you may take it off to bathe and then put it back on. If you have a permanent splint, cover your entire hand with 2 plastic bags. Place 1 bag around the other. Tape each bag with duct tape at the top end. Water can still leak in even when your hand is covered. So it's best to keep the splint away from water. If a splint gets wet, you can dry it with a hair-dryer on a cool setting.   · If you use buddy tape and it becomes wet or dirty, change it. You may replace it with paper, plastic, or cloth tape. Cloth tape and paper tapes must be kept dry. When re-applying buddy tape, use gauze or cotton padding between your fingers. This will prevent the skin from getting moist and breaking down, or macerating. It is very important to put padding at the web space. This is the small piece of skin that joins the bases of your fingers. Keep the buddy tape in place as instructed by your healthcare provider.  · You may use over-the-counter pain medicine to control pain, unless another pain medicine was prescribed. Talk with your provider before taking these medicines if you have chronic liver or kidney disease. Also talk with your provider if you have ever had a stomach ulcer or GI (gastrointestinal) bleeding.  · Do not  play sports or do any physical exercise until your healthcare provider says that you can.  Follow-up care  Follow up with your healthcare provider in 1 week, or as advised. Splints should generally not be left in place longer than 3 weeks to avoid stiffness and loss of joint function. It is important that you see the referral doctor. This provider can determine how long to keep your splint in place and when to begin hand exercises.  If X-rays were taken, you will be told of any new findings that may affect your care.  When to seek medical advice  Call your healthcare provider right away if any of the following occur:  · The injured finger has more pain or swelling  · The injured finger becomes red or warm  · The injured finger becomes cold, blue, numb, or tingly  Date Last Reviewed: 11/23/2015  © 0927-1639 [employer]. [address] [place] [address], [place] [address]. All rights reserved. This information is not intended as a substitute for professional medical care. Always follow your healthcare professional's instructions.

## 2019-02-22 NOTE — PROGRESS NOTES
Subjective:       Patient ID: Leena Christina is a 78 y.o. female.    Vitals:  weight is 60.3 kg (133 lb). Her temperature is 98.1 °F (36.7 °C). Her blood pressure is 130/70 and her pulse is 68. Her oxygen saturation is 97%.     Chief Complaint: Hand Injury    Pt reports she fell down stairs in her home last night. She c/o her right thumb pain and left hand 5th digit pain and bruising         Hand Injury    The incident occurred 6 to 12 hours ago. The incident occurred at home. The injury mechanism was a fall. The pain is present in the right hand and left hand. The pain is at a severity of 5/10. The pain is moderate. She has tried nothing for the symptoms.       Constitution: Negative for fatigue.   HENT: Negative for facial swelling and facial trauma.    Neck: Negative for neck stiffness.   Cardiovascular: Negative for chest trauma.   Eyes: Negative for eye trauma, double vision and blurred vision.   Gastrointestinal: Negative for abdominal trauma, abdominal pain and rectal bleeding.   Genitourinary: Negative for hematuria, missed menses, genital trauma and pelvic pain.   Musculoskeletal: Negative for pain, trauma, joint swelling and abnormal ROM of joint.   Skin: Negative for color change, wound, abrasion, laceration and bruising.   Neurological: Negative for dizziness, history of vertigo, light-headedness, coordination disturbances, altered mental status and loss of consciousness.   Hematologic/Lymphatic: Negative for history of bleeding disorder.   Psychiatric/Behavioral: Negative for altered mental status.       Objective:      Physical Exam   Constitutional: She is oriented to person, place, and time. She appears well-developed and well-nourished. She is cooperative.  Non-toxic appearance. She does not appear ill. No distress.   HENT:   Head: Normocephalic and atraumatic. Head is without abrasion, without contusion and without laceration.   Right Ear: Hearing, tympanic membrane, external ear and ear canal  normal. No hemotympanum.   Left Ear: Hearing, tympanic membrane, external ear and ear canal normal. No hemotympanum.   Nose: Nose normal. No mucosal edema, rhinorrhea or nasal deformity. No epistaxis. Right sinus exhibits no maxillary sinus tenderness and no frontal sinus tenderness. Left sinus exhibits no maxillary sinus tenderness and no frontal sinus tenderness.   Mouth/Throat: Uvula is midline, oropharynx is clear and moist and mucous membranes are normal. No trismus in the jaw. Normal dentition. No uvula swelling. No posterior oropharyngeal erythema.   Eyes: Conjunctivae, EOM and lids are normal. Pupils are equal, round, and reactive to light. Right eye exhibits no discharge. Left eye exhibits no discharge. No scleral icterus.   Sclera clear bilat   Neck: Trachea normal, normal range of motion, full passive range of motion without pain and phonation normal. Neck supple. No spinous process tenderness and no muscular tenderness present. No neck rigidity. No tracheal deviation present.   Cardiovascular: Normal rate, regular rhythm, normal heart sounds, intact distal pulses and normal pulses.   Pulmonary/Chest: Effort normal and breath sounds normal. No respiratory distress.   Abdominal: Soft. Normal appearance and bowel sounds are normal. She exhibits no distension, no pulsatile midline mass and no mass. There is no tenderness.   Musculoskeletal: Normal range of motion. She exhibits no edema or deformity.        Right hand: She exhibits bony tenderness.   No back pain.    Right hand with some ecchymoses down to MP joint.  No deformity..    Left hand with little finger with gross deformity and ecchymoses on the palmar aspect at the proximal IP joint   Neurological: She is alert and oriented to person, place, and time. She has normal strength. No cranial nerve deficit or sensory deficit. She exhibits normal muscle tone. She displays no seizure activity. Coordination normal. GCS eye subscore is 4. GCS verbal subscore  is 5. GCS motor subscore is 6.   Skin: Skin is warm, dry and intact. Capillary refill takes less than 2 seconds. No abrasion, no bruising, no burn, no ecchymosis and no laceration noted. She is not diaphoretic. No pallor.   Psychiatric: She has a normal mood and affect. Her speech is normal and behavior is normal. Judgment and thought content normal. Cognition and memory are normal.   Nursing note and vitals reviewed.      Assessment:       1. Closed dislocation of interphalangeal joint of left little finger    2. Nondisplaced fracture of distal phalanx of right thumb, initial encounter for closed fracture    3. Pain in both hands        Plan:         Closed dislocation of interphalangeal joint of left little finger  -     X-Ray Finger 2 or More Views Left; Future; Expected date: 02/22/2019  -     SPLINT FOR HOME USE  -     Ambulatory referral to Orthopedics    Nondisplaced fracture of distal phalanx of right thumb, initial encounter for closed fracture  -     SPLINT FOR HOME USE    Pain in both hands  -     XR HAND COMPLETE 3 VIEW LEFT; Future; Expected date: 02/22/2019  -     XR HAND COMPLETE 3 VIEW RIGHT; Future; Expected date: 02/22/2019     The patient has Percocet left over from a renal procedure and did not want any further narcotic pain medication prescribed.  She has seen Dr. Saenz from Orthopedics in the past and a consult was placed to him for follow-up.  The post reduction film did show reduction of the left little finger interphalangeal joint dislocation.  The thumb of the right hand and the little finger of the left hand were both placed and aluminum padded splints.  Ice elevation was recommended.  And she will call Dr. Saenz to find out when to follow up with him.    NVI finger and hand after splint placement.          Xr Hand Complete 3 View Left    Addendum Date: 2/22/2019    Correction:  There is mild joint space narrowing of the 2nd DIP joint WITHOUT evidence of fusion.  There is also  subluxation and erosion at the 5th PIP joint, not mentioned in the original report.  Findings remain suspicious for osteoarthritis, likely with an erosive component. Electronically signed by: Lisa Soriano MD Date:    02/22/2019 Time:    13:02    Result Date: 2/22/2019  EXAMINATION: XR HAND COMPLETE 3 VIEW LEFT CLINICAL HISTORY: . Pain in right hand TECHNIQUE: PA, lateral, and oblique views of the left hand were performed. COMPARISON: None FINDINGS: There Is subluxation at the 3rd and 5th DIP joints.  Interphalangeal joint space narrowing is present most pronounced in the 2nd 3rd and 4th DIP joints with joint space obliteration/bony fusion of the 2nd DIP joint.  Marginal osteophytes are present and there is the suggestion of possible central erosion at the 3rd DIP joint.  Mild subchondral sclerosis and joint space narrowing is present at the 1st carpometacarpal joint.  Findings overall are most suggestive of osteoarthritis, possibly with an erosive component     Joint space narrowing, osteophyte formation and subluxations most pronounced in the DIP joints.  Pattern of findings is most suggestive of osteoarthritis, possibly with an erosive component. Electronically signed by: Lisa Soriano MD Date:    02/22/2019 Time:    11:14    Xr Hand Complete 3 View Right    Result Date: 2/22/2019  EXAMINATION: XR HAND COMPLETE 3 VIEW RIGHT CLINICAL HISTORY: Pain in right hand TECHNIQUE: PA, lateral, and oblique views of the right hand were performed. COMPARISON: None FINDINGS: Degenerative changes at the 1st carpometacarpal joint.  Punctate calcification abuts the 1st MCP joint.  Joint space narrowing and hypertrophic new bone involving the DIP joints particularly index and long fingers.  The fingers are superimposed on the lateral projection.  Questionable fracture involving the distal phalanx of the thumb as seen on the lateral.  Correlation with point tenderness.     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 There appears to be a nondisplaced fracture involving the distal phalanx of the thumb.  Correlation with point tenderness suggested. This report was flagged in [employer] as abnormal. Electronically signed by: Jorge Lowery MD Date:    02/22/2019 Time:    11:09    X-ray Finger 2 Or More Views Left    Result Date: 2/22/2019  EXAMINATION: XR FINGER 2 OR MORE VIEWS LEFT CLINICAL HISTORY: post reduction;  Dislocation of unspecified interphalangeal joint of left little finger, initial encounter TECHNIQUE: AP and lateral views of the left little finger. COMPARISON: February 22, 2019 at 10:39. FINDINGS: PIP joint of the little finger appears in satisfactory alignment post reduction.  Significant degenerative change at the PIP and DIP joints noted.  It is difficult to exclude a fracture distal aspect of the proximal phalanx..  Follow-up studies could be obtained to exclude a healing fracture if pain persists.     Satisfactory alignment of the PIP joint post reduction.  Extensive hypertrophic new bone and degenerative change about both PIP and DIP joints as above.  Difficult to exclude a fracture fragment particularly from the distal aspect of the proximal phalanx.  Follow-up films may be helpful if pain persists. Electronically signed by: Jorge Lowery MD Date:    02/22/2019 Time:    12:00  Patient Instructions     Finger Dislocation  A finger dislocation occurs when the tissues, or ligaments, that hold the joint together are torn. The bones then move apart, or are dislocated, out of their normal position. This causes pain, swelling, and bruising. Sometimes there is also a small chip fracture. Once the joint is put back into place again, it will take about 6 weeks for the ligaments to heal. During this time, you should protect your finger from re-injury.       Closed Thumb Fracture  You have a broken (fractured) thumb. This causes local pain, swelling, and often bruising. This injury will usually  take about 4 to 6 weeks or longer to heal. Thumb fractures may be treated with a splint or cast. This protects the thumb and holds the bone in place while it heals. More serious fractures may need surgery.     If the thumbnail has been severely injured, it may fall off in 1 to 2 weeks. A new thumbnail will usually start to grow back within a month.  Home care  Follow these guidelines when caring for yourself at home:  · Keep your arm elevated to reduce pain and swelling. When sitting or lying down elevate your arm above the level of your heart. You can do this by placing your arm on a pillow that rests on your chest or on a pillow at your side. This is most important during the first 2 days (48 hours) after the injury.  · Put an ice pack on the injured area. Do this for 20 minutes every 1 to 2 hours the first day for pain relief. You can make an ice pack by wrapping a plastic bag of ice cubes in a thin towel. As the ice melts, be careful that the cast or splint doesnt get wet. Continue using the ice pack 3 to 4 times a day for the next 2 days. Then use the ice pack as needed to ease pain and swelling.  · If a splint was put on, leave this in place for the time advised. This will keep the bones from moving out of position.  · Keep the cast or splint completely dry at all times. Bathe with your cast or splint out of the water. Protect it with a large plastic bag, rubber-banded at the top end. If a fiberglass cast or splint gets wet, you can dry it with a hair dryer.  · You may use acetaminophen or ibuprofen to control pain, unless another pain medicine was prescribed. If you have chronic liver or kidney disease, talk with your healthcare provider before using these medicines. Also talk with your provider if youve had a stomach ulcer or gastrointestinal bleeding.  · Dont put creams or objects under the cast if you have itching.  Follow-up care  Follow up with your healthcare provider in 1 week, or as advised. This  is to make sure the bone is healing the way it should. Talk with your provider about when it is safe to return to sports or work.  X-rays may be taken. You will be told of any new findings that may affect your care.  When to seek medical advice  Call your healthcare provider right away if any of these occur:  · The cast or splint cracks  · The plaster cast or splint becomes wet or soft  · The fiberglass cast or splint stays wet for more than 24 hours  · Bad odor from the cast or wound fluid stains the cast  · Pain or swelling gets worse  · Redness or warmth in the hand  · Fingers or hand become cold, blue, numb, or tingly  · You cant move your hand or fingers  · Skin around cast or splint becomes red  · Fever of 100.4°F (38°C) or higher, or as directed by your healthcare provider  Date Last Reviewed: 2/1/2017  © 9789-8822 [employer]. [address] [place] [address]. All rights reserved. This information is not intended as a substitute for professional medical care. Always follow your healthcare professional's instructions.      Hand exercises may be prescribed at your follow-up visit. These can help speed healing and maintain function. In most cases you will regain full function of your finger. But it may take 12 to 18 months before all mild pain and swelling goes away and full function returns.  Home care  · Keep your hand raised, or elevated, to reduce pain and swelling. When sitting or lying down, raise your arm above the level of your heart. You can do this by placing your arm on a pillow that rests on your chest. Or your arm can be on a pillow at your side. This is most important during the first 48 hours after injury.  · Put an ice pack on the injured area for no more than 15 to 20 minutes every 3 to 6 hours. Do this for the first 24 to 48 hours. You can make an ice pack by wrapping a plastic Ziploc bag of ice cubes in a thin towel. As the ice melts, be careful that the tape, gauze,  or splint doesnt get wet. After that, keep using ice as needed to ease pain and swelling.  · If you have a removable splint, you may take it off to bathe and then put it back on. If you have a permanent splint, cover your entire hand with 2 plastic bags. Place 1 bag around the other. Tape each bag with duct tape at the top end. Water can still leak in even when your hand is covered. So it's best to keep the splint away from water. If a splint gets wet, you can dry it with a hair-dryer on a cool setting.   · If you use buddy tape and it becomes wet or dirty, change it. You may replace it with paper, plastic, or cloth tape. Cloth tape and paper tapes must be kept dry. When re-applying buddy tape, use gauze or cotton padding between your fingers. This will prevent the skin from getting moist and breaking down, or macerating. It is very important to put padding at the web space. This is the small piece of skin that joins the bases of your fingers. Keep the buddy tape in place as instructed by your healthcare provider.  · You may use over-the-counter pain medicine to control pain, unless another pain medicine was prescribed. Talk with your provider before taking these medicines if you have chronic liver or kidney disease. Also talk with your provider if you have ever had a stomach ulcer or GI (gastrointestinal) bleeding.  · Do not play sports or do any physical exercise until your healthcare provider says that you can.  Follow-up care  Follow up with your healthcare provider in 1 week, or as advised. Splints should generally not be left in place longer than 3 weeks to avoid stiffness and loss of joint function. It is important that you see the referral doctor. This provider can determine how long to keep your splint in place and when to begin hand exercises.  If X-rays were taken, you will be told of any new findings that may affect your care.  When to seek medical advice  Call your healthcare provider right away if any  of the following occur:  · The injured finger has more pain or swelling  · The injured finger becomes red or warm  · The injured finger becomes cold, blue, numb, or tingly  Date Last Reviewed: 11/23/2015  © 2737-2822 [employer]. [address] [place] [id number]. All rights reserved. This information is not intended as a substitute for professional medical care. Always follow your healthcare professional's instructions.

## 2019-02-23 NOTE — PROCEDURES
Procedures     Preop diagnosis closed dislocation of proximal interphalangeal joint of left 5th f then this was able to be put in place without difficulty tatiana.    Postop diagnosis successful reduction dislocation    Postop x-ray confirmed appropriate alignment      Anesthesia was a digital block combined with a hematoma infiltration.  4 cc total of local anesthetic was used using 1% lidocaine.    No blood loss and the patient tolerated the procedure well.    The procedure is as follows the local anesthetic was infiltrated as previously described 1st as a nerve block of the 5th digit and then also extending to infiltrating the palmar aspect of the hematoma at the site of the dislocation.  Using some manual traction and distraction the middle phalanx was pulled away from the proximal phalanx and proximal phalanx was able to be mobilized such that the middle phalanx and was in approximation with interphalangeal joint.  Prior to that the finger was unable to be flexed once it was in place was flexed voluntarily and also passively.  Palpation noted slight lateral dislocation.  Alignment was able to be achieved with slight pressure without difficulty.    Once the x-ray was done confirming good alignment a aluminum finger splint was placed.  There is a suspicion of fracture distally in the proximal phalanx and follow-up will be needed for possible longer term immobilization.

## 2019-04-25 ENCOUNTER — HOSPITAL ENCOUNTER (EMERGENCY)
Facility: HOSPITAL | Age: 79
Discharge: HOME | End: 2019-04-25
Attending: EMERGENCY MEDICINE
Payer: MEDICARE

## 2019-04-25 VITALS
DIASTOLIC BLOOD PRESSURE: 86 MMHG | WEIGHT: 121 LBS | HEART RATE: 86 BPM | OXYGEN SATURATION: 98 % | HEIGHT: 62 IN | BODY MASS INDEX: 22.26 KG/M2 | TEMPERATURE: 98 F | RESPIRATION RATE: 17 BRPM | SYSTOLIC BLOOD PRESSURE: 148 MMHG

## 2019-04-25 DIAGNOSIS — R31.9 HEMATURIA, UNSPECIFIED TYPE: Primary | ICD-10-CM

## 2019-04-25 DIAGNOSIS — R82.81 PYURIA, STERILE: ICD-10-CM

## 2019-04-25 DIAGNOSIS — D64.9 SEVERE ANEMIA: ICD-10-CM

## 2019-04-25 DIAGNOSIS — T83.9XXA: ICD-10-CM

## 2019-04-25 LAB
BACTERIA #/AREA URNS HPF: ABNORMAL /HPF
BASOPHILS # BLD AUTO: 0.01 K/UL (ref 0–0.2)
BASOPHILS NFR BLD: 0.3 % (ref 0–1.9)
BILIRUB UR QL STRIP: NEGATIVE
CLARITY UR: ABNORMAL
COLOR UR: YELLOW
DIFFERENTIAL METHOD: ABNORMAL
EOSINOPHIL # BLD AUTO: 0.1 K/UL (ref 0–0.5)
EOSINOPHIL NFR BLD: 3.2 % (ref 0–8)
ERYTHROCYTE [DISTWIDTH] IN BLOOD BY AUTOMATED COUNT: 16.1 % (ref 11.5–14.5)
GLUCOSE UR QL STRIP: NEGATIVE
HCT VFR BLD AUTO: 22.2 % (ref 37–48.5)
HGB BLD-MCNC: 7 G/DL (ref 12–16)
HGB UR QL STRIP: ABNORMAL
HYALINE CASTS #/AREA URNS LPF: 20 /LPF
KETONES UR QL STRIP: NEGATIVE
LEUKOCYTE ESTERASE UR QL STRIP: ABNORMAL
LYMPHOCYTES # BLD AUTO: 0.4 K/UL (ref 1–4.8)
LYMPHOCYTES NFR BLD: 10.8 % (ref 18–48)
MCH RBC QN AUTO: 27.2 PG (ref 27–31)
MCHC RBC AUTO-ENTMCNC: 31.5 G/DL (ref 32–36)
MCV RBC AUTO: 86 FL (ref 82–98)
MICROSCOPIC COMMENT: ABNORMAL
MONOCYTES # BLD AUTO: 0.3 K/UL (ref 0.3–1)
MONOCYTES NFR BLD: 7.5 % (ref 4–15)
NEUTROPHILS # BLD AUTO: 2.9 K/UL (ref 1.8–7.7)
NEUTROPHILS NFR BLD: 78.2 % (ref 38–73)
NITRITE UR QL STRIP: NEGATIVE
PH UR STRIP: 5 [PH] (ref 5–8)
PLATELET # BLD AUTO: 116 K/UL (ref 150–350)
PMV BLD AUTO: 10.1 FL (ref 9.2–12.9)
PROT UR QL STRIP: ABNORMAL
RBC # BLD AUTO: 2.57 M/UL (ref 4–5.4)
RBC #/AREA URNS HPF: >100 /HPF (ref 0–4)
SP GR UR STRIP: 1.01 (ref 1–1.03)
URN SPEC COLLECT METH UR: ABNORMAL
UROBILINOGEN UR STRIP-ACNC: NEGATIVE EU/DL
WBC # BLD AUTO: 3.71 K/UL (ref 3.9–12.7)
WBC #/AREA URNS HPF: 20 /HPF (ref 0–5)
WBC CLUMPS URNS QL MICRO: ABNORMAL

## 2019-04-25 PROCEDURE — 85025 COMPLETE CBC W/AUTO DIFF WBC: CPT

## 2019-04-25 PROCEDURE — 81000 URINALYSIS NONAUTO W/SCOPE: CPT

## 2019-04-25 PROCEDURE — 87086 URINE CULTURE/COLONY COUNT: CPT

## 2019-04-25 PROCEDURE — 99284 EMERGENCY DEPT VISIT MOD MDM: CPT

## 2019-04-25 NOTE — ED PROVIDER NOTES
"Encounter Date: 4/25/2019    SCRIBE #1 NOTE: I, Bob Beltrán, am scribing for, and in the presence of,  Reji Hahn MD. I have scribed the following portions of the note - Other sections scribed: HPI and ROS.       History     Chief Complaint   Patient presents with    Abdominal Pain     pt states hx of pertioneal / endometrial cancer. receiving chemo. 04/17 urterian stent replaced. sence has had episodes of brown discharge, pain w/ urination and intermitten fevers. pt has lower abd pain as well.     CC: Abdominal Pain     HPI: This 78 y.o F with a hx of  Anemia, Peritoneal Cancer, DM type II, Endometrial cancer, HDL lipoprotein deficiency, HTN, HLD and Ovarian cancer presents to the ED c/o chronic right lower abdominal pain, right flank pain and "blanca" vaginal discharge s/p stent placement 11/2018. She denies abdominal pain and flank pain currently. She is currently receiving chemotherapy tx. The pt reports that she has had a R ureter stent placed and replaced every 3 months. The pt notes that she also has chronic urinary incontinence and notices the vaginal discharge in her pad. Additionally, the pt c/o dysuria, subjective fever and chronic SOB with exertion. The pt denies chest pain, arm or leg trouble, rash, diarrhea, diaphoresis, cough, rhinorrhea, sore throat and otalgia. She attempted tx with Tylenol with minimal relief.     Oncologist- Caleb Dutta MD    PSHx: Hysterectomy; Breast cyst excision; Oophorectomy; and ureter stent (Right).        The history is provided by the patient. No  was used.     Review of patient's allergies indicates:   Allergen Reactions    Pyridium [phenazopyridine] Hallucinations    Dilaudid [hydromorphone] Other (See Comments)     hallucinations     Past Medical History:   Diagnosis Date    Allergic rhinitis, seasonal 1/24/2013    Anemia 1/24/2013    Cancer     Peritoneal with lymph node involvement ?8    Diabetes mellitus type II " "1/24/2013    Endometrial cancer     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    HTN (hypertension) 1/24/2013    Hyperlipidemia 1/24/2013    Insomnia 7/18/2014    Ovarian cancer 1/24/2013     Past Surgical History:   Procedure Laterality Date    BREAST CYST EXCISION      HYSTERECTOMY      OOPHORECTOMY      ureter stent Right      Family History   Problem Relation Age of Onset    Breast cancer Other      Social History     Tobacco Use    Smoking status: Never Smoker    Smokeless tobacco: Never Used   Substance Use Topics    Alcohol use: No     Frequency: Never    Drug use: No     Review of Systems   Constitutional: Positive for fever (subjective). Negative for chills.   HENT: Negative for congestion, ear pain, rhinorrhea and sore throat.    Eyes: Negative for pain and visual disturbance.   Respiratory: Positive for shortness of breath (chronic). Negative for cough.    Cardiovascular: Negative for chest pain.   Gastrointestinal: Positive for abdominal pain (R lower, chronic, not currently). Negative for diarrhea, nausea and vomiting.   Genitourinary: Positive for dysuria, flank pain (R, not currently) and vaginal discharge ("blanca").   Musculoskeletal: Negative for back pain and neck pain.   Skin: Negative for rash.   Neurological: Negative for headaches.       Physical Exam     Initial Vitals [04/25/19 0550]   BP Pulse Resp Temp SpO2   (!) 119/59 90 18 99 °F (37.2 °C) (!) 94 %      MAP       --         Physical Exam  The patient was examined specifically for the following:   General:No significant distress, Good color, Warm and dry. Head and neck:Scalp atraumatic, Neck supple. Neurological:Appropriate conversation, Gross motor deficits. Eyes:Conjugate gaze, Clear corneas. ENT: No epistaxis. Cardiac: Regular rate and rhythm, Grossly normal heart tones. Pulmonary: Wheezing, Rales. Gastrointestinal: Abdominal tenderness, Abdominal distention. Musculoskeletal: Extremity deformity, Apparent pain with range of " motion of the joints. Skin: Rash.   The findings on examination were normal except for the following:  The abdomen is completely nontender.  Lungs are clear.  The heart tones are normal.  The patient is slightly pale.  The patient has a small 0.75 x 0.25 cm ulcer at the 3 o'clock position in the perianal region.  I see no active hemorrhoids.  There is no rectal discharge. There is no active bleeding.  The vagina looks normal. It is somewhat atrophic.  There is no vaginal discharge. There were no other vaginal lesions.  The abdomen is completely nontender.  ED Course   Procedures  Labs Reviewed   URINALYSIS, REFLEX TO URINE CULTURE - Abnormal; Notable for the following components:       Result Value    Appearance, UA Cloudy (*)     Protein, UA 2+ (*)     Occult Blood UA 3+ (*)     Leukocytes, UA 2+ (*)     All other components within normal limits    Narrative:     Preferred Collection Type->Urine, Clean Catch   URINALYSIS MICROSCOPIC - Abnormal; Notable for the following components:    RBC, UA >100 (*)     WBC, UA 20 (*)     Bacteria, UA Few (*)     Hyaline Casts, UA 20 (*)     All other components within normal limits    Narrative:     Preferred Collection Type->Urine, Clean Catch   CBC W/ AUTO DIFFERENTIAL - Abnormal; Notable for the following components:    WBC 3.71 (*)     RBC 2.57 (*)     Hemoglobin 7.0 (*)     Hematocrit 22.2 (*)     MCHC 31.5 (*)     RDW 16.1 (*)     Platelets 116 (*)     Lymph # 0.4 (*)     Gran% 78.2 (*)     Lymph% 10.8 (*)     All other components within normal limits   CULTURE, URINE          Imaging Results          X-Ray Abdomen AP 1 View (KUB) (Final result)  Result time 04/25/19 08:17:09    Final result by Hansel Rico MD (04/25/19 08:17:09)                 Impression:      No acute process.      Electronically signed by: Hansel Rico MD  Date:    04/25/2019  Time:    08:17             Narrative:    EXAMINATION:  XR ABDOMEN AP 1 VIEW    CLINICAL HISTORY:  Unspecified complication  of genitourinary prosthetic device, implant and graft, initial encounter    TECHNIQUE:  AP View(s) of the abdomen was performed.    COMPARISON:  None    FINDINGS:  There is a right-sided double-J stent.  Multiple staples are seen scattered throughout the abdomen.  Calcifications are noted in the right lower quadrant and left upper quadrant, of uncertain etiology.  This may represent contrast within loops of bowel.  Correlation advised.  No definite evidence for free air or dilated loops of bowel noting that the lung bases were not included in the field of view.  Osseous structures demonstrate degenerative changes of the spine.                              Medical decision making:  Given the above, this patient complains of some right-sided abdominal pain that comes and goes.  The patient has a right ureteral stent.  I believe the pain is from the ureteral stent.  The patient's urine has some pyuria and hematuria.  This is common in patients with ureteral stents.  There are few bacteria.  I doubt urinary tract infection.  A culture will be done.  I will fax results to , at the patient's request.  We will use the fax number of [phone].  The patient gives consent him permission.  The patient is capable.  CBC reveals a significant anemia.  Transfusion was recommended.  Transfusion was offered here today.  The patient declines wishing instead to go to [facility] the get the transfusion done as an outpatient.                Scribe Attestation:   Scribe #1: I performed the above scribed service and the documentation accurately describes the services I performed. I attest to the accuracy of the note.    Attending Attestation:           Physician Attestation for Scribe:  Physician Attestation Statement for Scribe #1: I, Reji Hahn MD, reviewed documentation, as scribed by Bob Beltrán in my presence, and it is both accurate and complete.                    Clinical Impression:       ICD-10-CM ICD-9-CM   1.  Hematuria, unspecified type R31.9 599.70   2. Complication of urinary stent T83.9XXA 996.76   3. Pyuria, sterile N39.0 791.9   4. Severe anemia D64.9 285.9                                Reji Hahn MD  04/25/19 0989       Reji Hahn MD  04/25/19 1022

## 2019-04-25 NOTE — ED NOTES
Patient care report received by STANLEY Randolph. Patient resting in bed in NAD. Side rails up x2.

## 2019-04-25 NOTE — ED NOTES
MD has consulted with hem/onc doctor at [facility] and will send CBC results.

## 2019-04-25 NOTE — DISCHARGE INSTRUCTIONS
Please call  today to make arrangements for your transfusion.  Return immediately if you get worse or if new problems develop.  Please take Fergon tablets, 1 by mouth twice a day.  Please take a daily multivitamin.

## 2019-04-25 NOTE — ED NOTES
Patient states her hem/onc doctor needs blood work to be sent to [facility]. MD aware and states, patient will have to arrive to [facility] to do blood work because patient is already in the [facility] system for routine blood draws.

## 2019-04-25 NOTE — ED TRIAGE NOTES
"Arrived with c/o fever (102F max) and intermittent lower abdominal pain that she described as "like a band going around." The pain is mainly on the right side where the ureter stents. Denies any pain at the moment. Pt speaking with MD.   "

## 2019-04-27 LAB — BACTERIA UR CULT: NORMAL

## 2019-05-03 ENCOUNTER — PATIENT MESSAGE (OUTPATIENT)
Dept: FAMILY MEDICINE | Facility: CLINIC | Age: 79
End: 2019-05-03

## 2019-05-06 ENCOUNTER — NURSE TRIAGE (OUTPATIENT)
Dept: ADMINISTRATIVE | Facility: CLINIC | Age: 79
End: 2019-05-06

## 2019-05-06 NOTE — TELEPHONE ENCOUNTER
"    Reason for Disposition   Nursing judgment    Protocols used: ST NO PROTOCOL CALL: SICK ADULT-A-[place]    Leena and her sister called Dr Silveira's office on Friday requesting an order for home oxygen, and was instructed to go to ED at that time by PA in his clinic, due to severe shortness of breath. Leena did not go, and called triage line this morning to say she is still very short of breath, can only walk "no more than a few feet, and yesterday my home health nurse said I needed oxygen badly because I am at 79% to 80%". She said she was not able to get the oxygen from her oncologist, but she is being treated by oncology at [facility] for recurrent primary peritoneal carcinoma, on palliative immunotherapy, with recent hospitalizations. She said she "needs a test to prove I need the oxygen."  Encouraged her to follow her provider's instructions and go to ED for evaluation now. She said her sister will take her to [facility], as that is where she has been receiving all her care recently.  Message to Tayo Silveira MD, pcp.  Her sister still would like a call from Dr Silveira as soon as possible at [phone].  Assured her will message him with her request.  Please contact caller directly with any additional care advice.    "

## 2019-05-20 RX ORDER — CELECOXIB 200 MG/1
CAPSULE ORAL
Qty: 90 CAPSULE | Refills: 12 | Status: SHIPPED | OUTPATIENT
Start: 2019-05-20 | End: 2020-04-29

## 2019-05-20 RX ORDER — PRAMIPEXOLE 0.38 MG/1
0.38 TABLET, EXTENDED RELEASE ORAL DAILY
Qty: 90 TABLET | Refills: 12 | Status: SHIPPED | OUTPATIENT
Start: 2019-05-20

## 2019-05-20 RX ORDER — PROCHLORPERAZINE MALEATE 5 MG
5 TABLET ORAL EVERY 6 HOURS PRN
Qty: 90 TABLET | Refills: 12 | Status: SHIPPED | OUTPATIENT
Start: 2019-05-20

## 2019-05-20 RX ORDER — PROCHLORPERAZINE MALEATE 5 MG
5 TABLET ORAL EVERY 6 HOURS PRN
COMMUNITY
End: 2019-05-20 | Stop reason: SDUPTHER

## 2019-05-20 RX ORDER — IRBESARTAN 300 MG/1
300 TABLET ORAL NIGHTLY
Qty: 90 TABLET | Refills: 12 | Status: SHIPPED | OUTPATIENT
Start: 2019-05-20 | End: 2020-04-29

## 2019-05-20 RX ORDER — TIZANIDINE 4 MG/1
TABLET ORAL
Qty: 368 TABLET | Refills: 12 | Status: SHIPPED | OUTPATIENT
Start: 2019-05-20

## 2019-05-20 NOTE — TELEPHONE ENCOUNTER
----- Message from Yajaira Stokes sent at 5/20/2019  8:58 AM CDT -----  Contact: Marina garduno  Type:  Pharmacy Calling to Clarify an RX    Name of Caller:  Marina  [facility]arm[facility] Name:  [facility]      Prescription Name:    1. celecoxib (CELEBREX) 200 MG capsule  2. Compazine 5mg  3. irbesartan (AVAPRO) 300 MG tablet  4. mirapex  5. tiZANidine (ZANAFLEX) 4 MG tablet  What do they need to clarify?:  Patient is new with [facility]. Needs prescriptions for the meds.  Best Call Back Number:  [phone]  Additional Information:

## 2019-05-28 ENCOUNTER — TELEPHONE (OUTPATIENT)
Dept: FAMILY MEDICINE | Facility: CLINIC | Age: 79
End: 2019-05-28

## 2019-05-28 NOTE — TELEPHONE ENCOUNTER
Spoke with [facility] she states pt is new to them and wanted to clarify pt takes both medications. Informed them that she does take both and has been on them for sometime together; verbalized understanding

## 2019-05-28 NOTE — TELEPHONE ENCOUNTER
----- Message from Kali Camacho sent at 5/28/2019  8:14 AM CDT -----  Contact: Len from [facility] [phone]  Type:  Pharmacy Calling to Clarify an RX    Name of Caller: Len from [facility]     Pharmacy Name: [facility]    Prescription Name:ramipril (ALTACE) 5 MG capsule &  irbesartan (AVAPRO) 300 MG tablet    What do they need to clarify? Len from [facility] called to get clarification on the patient's medications. They want to know if the patient should be on both medications: ramipril (ALTACE) 5 MG capsule& irbesartan (AVAPRO) 300 MG tablet, at the same time.    Best Call Back Number: [phone]

## 2019-05-29 RX ORDER — ESOMEPRAZOLE MAGNESIUM 40 MG/1
40 CAPSULE, DELAYED RELEASE ORAL DAILY
Qty: 90 CAPSULE | Refills: 12 | Status: SHIPPED | OUTPATIENT
Start: 2019-05-29

## 2019-05-29 NOTE — TELEPHONE ENCOUNTER
----- Message from Wilma Rosales sent at 5/29/2019  9:37 AM CDT -----  Contact: Len with [facility] [phone]  Type: RX Refill Request    Who Called: Len    Refill or New Rx:Refill    RX Name and Strength:NEXIUM 40 mg capsule    How is the patient currently taking it? (1XDay)    Is this a 30 day or 90 day RX:90 Day    Preferred Pharmacy with phone number:  [place] [email or web address] [email or web address]  [facility] [address]  Phone: [phone] Fax: [phone]        Local or Mail Order:Mail    Ordering Provider:Dr. Silveira    Would the patient rather a call back or a response via [facility]? Callback    Best Call Back Number:[phone]    Additional Information: N/A

## 2019-06-14 ENCOUNTER — EXTERNAL HOME HEALTH (OUTPATIENT)
Dept: HOME HEALTH SERVICES | Facility: HOSPITAL | Age: 79
End: 2019-06-14
Payer: MEDICARE

## 2019-06-14 PROCEDURE — G0180 MD CERTIFICATION HHA PATIENT: HCPCS | Mod: ,,, | Performed by: INTERNAL MEDICINE

## 2019-06-14 PROCEDURE — G0180 PR HOME HEALTH MD CERTIFICATION: ICD-10-PCS | Mod: ,,, | Performed by: INTERNAL MEDICINE

## 2019-07-10 RX ORDER — SERTRALINE HYDROCHLORIDE 100 MG/1
TABLET, FILM COATED ORAL
Qty: 180 TABLET | Refills: 12 | Status: SHIPPED | OUTPATIENT
Start: 2019-07-10

## 2019-07-19 ENCOUNTER — TELEPHONE (OUTPATIENT)
Dept: HOME HEALTH SERVICES | Facility: HOSPITAL | Age: 79
End: 2019-07-19
Payer: MEDICARE

## 2019-07-25 ENCOUNTER — TELEPHONE (OUTPATIENT)
Dept: FAMILY MEDICINE | Facility: CLINIC | Age: 79
End: 2019-07-25

## 2019-07-25 NOTE — TELEPHONE ENCOUNTER
----- Message from Zafar Amor sent at 7/25/2019 10:41 AM CDT -----  Contact: Rosalina- [facility]  Type: Patient Call Back    Who called: Rosalina - [facility]    What is the request in detail: She would do discuss orders for billing and the orders are uploaded in [facility].    Can the clinic reply by [facility]? No    Would the patient rather a call back or a response via [facility]? Callback    Best call back number: [phone]

## 2019-08-06 ENCOUNTER — TELEPHONE (OUTPATIENT)
Dept: FAMILY MEDICINE | Facility: CLINIC | Age: 79
End: 2019-08-06

## 2019-08-06 NOTE — TELEPHONE ENCOUNTER
Spoke with pharmacy that meds can only be changed by provider and patient is due office visit, she may discuss her request for changes at office visit when scheduled

## 2019-08-06 NOTE — TELEPHONE ENCOUNTER
----- Message from Leena Sue sent at 8/6/2019  3:32 PM CDT -----  Type: Patient Call Back    Who called: Thalia with [facility]     What is the request in detail: rep asking for prescription for pt to take ---celecoxib (CELEBREX) 200 MG capsule-- only on Monday, Wednesdays, and Fridays as requested by pt.  ----2. Rep states pt asking to take esomeprazole (NEXIUM) 40 MG capsule---- instead of taking 1x a day to please take on Monday's, Wednesday's, and Friday's only. She states she is not sure why pt requests to make these changes.     Can the clinic reply by [facility]? No     Would the patient rather a call back or a response via [facility]? Call back     Best call back number: [phone]   Option 3.    Additional Information:

## 2019-09-01 DIAGNOSIS — I10 ESSENTIAL HYPERTENSION: ICD-10-CM

## 2019-09-03 RX ORDER — HYDROCHLOROTHIAZIDE 25 MG/1
TABLET ORAL
Qty: 90 TABLET | Refills: 0 | Status: SHIPPED | OUTPATIENT
Start: 2019-09-03 | End: 2019-11-30 | Stop reason: SDUPTHER

## 2019-09-04 DIAGNOSIS — M54.2 CERVICALGIA: Primary | ICD-10-CM

## 2019-10-07 ENCOUNTER — CLINICAL SUPPORT (OUTPATIENT)
Dept: REHABILITATION | Facility: HOSPITAL | Age: 79
End: 2019-10-07
Payer: MEDICARE

## 2019-10-07 DIAGNOSIS — R29.898 DECREASED ROM OF NECK: ICD-10-CM

## 2019-10-07 DIAGNOSIS — R53.1 DECREASED STRENGTH: ICD-10-CM

## 2019-10-07 DIAGNOSIS — M25.611 DECREASED ROM OF RIGHT SHOULDER: ICD-10-CM

## 2019-10-07 DIAGNOSIS — M54.2 NECK PAIN: ICD-10-CM

## 2019-10-07 PROCEDURE — 97161 PT EVAL LOW COMPLEX 20 MIN: CPT | Mod: PN

## 2019-10-07 PROCEDURE — 97110 THERAPEUTIC EXERCISES: CPT | Mod: PN

## 2019-10-07 NOTE — PLAN OF CARE
[facility]  Physical Therapy Initial Evaluation    Name: Leena Christina  Clinic Number: [id number]    Therapy Diagnosis:   Encounter Diagnoses   Name Primary?    Neck pain     Decreased ROM of neck     Decreased ROM of right shoulder     Decreased strength      Physician: Srikanth White MD    Physician Orders: PT Eval and Treat   Medical Diagnosis from Referral: Cervicalgia  Evaluation Date: 10/7/2019  Authorization Period Expiration: 12/31/2019  Plan of Care Expiration: 1/7/2010  Visit # / Visits authorized: 1/20    Time In: 10:00a  Time Out: 10:58a  Total Billable Time: 58 minutes    Precautions: Standard, Fall and cancer    Subjective   Date of onset: August 9, 2019   History of current condition - Lucia reports: August 9, 2019 - pain in neck, unable to turn all the way to the right. She woke up with it one morning. Seen at [facility] in the ER on August 10, 2019. She was unable to lift her R arm afterwards. The doctors told her she had blood in her neck muscles that went to her rotator cuff and that was what was causing the pain. She received muscle relaxers. She was also told she had a tear in her neck muscles and caused inflammation to cause the pain. She is doing fine now. She feels like now the pain is gone completely. Except for when turning her head to the right or lifting things overhead. The pain doesn't limit her. Yardwork. She states she doesn't know how this happened, she used to wear a neck brace but not sure why she did that. She had no numbness or tingling. 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    Medications:   Leena has a current medication list which includes the following prescription(s): aspirin, atorvastatin, b infantis/b ani/b kayode/b bifid, blood sugar diagnostic, blood-glucose meter, calcium carbonate, celecoxib, esomeprazole, ferrous sulfate, fish oil-omega-3 fatty acids, glucosamine-chondroitin, hydrochlorothiazide, irbesartan, lactobacillus combination no.4, lancets, pantoprazole, pramipexole, prochlorperazine, ramipril, rosuvastatin, sertraline, tizanidine, zejula, and zolpidem.    Allergies:   Review of patient's allergies indicates:   Allergen Reactions    Pyridium [phenazopyridine] Hallucinations    Dilaudid [hydromorphone] Other (See Comments)     hallucinations        Imaging, none on file     Prior Therapy: not for this   Social History: stairs with railings lives alone  Occupation: retired   Hobbies: yardwork sometimes  Prior Level of Function: independent   Current Level of Function: requires assistance due to cancer     Pain:  Current 0/10, worst 1/10, best 0/10   Location: right neck  and shoulder   Description: bothersome  Aggravating Factors: turning her head to the right   Easing Factors: not performing turning to the R    Pts goals: get stronger    Objective      Observation: pt presents with SPC     Posture: forward head posture and increased thoracic kyphosis,    Cervical Range of Motion:    Degrees Pain   Flexion 45 none   Extension 35 Mild pain in right side of neck      Right Rotation 55 Mild pain in right side of neck      Left Rotation 50 none     Right Side Bending 5 Pain on right side of neck   Left Side Bending 5 Pain on right side of neck       Shoulder Range of Motion:   Shoulder Left Right   Flexion 160 135   Abduction 170 155     Strength:  Cervical MMT    Flexion 3+   Extension 4-   Right Side Bend 2   Left Side Bend 2     Upper Extremity Strength  (R) UE  (L) UE    Shoulder flexion: 4/5 Shoulder flexion: 4/5   Shoulder Abduction: 3+/5 p!  Shoulder abduction: 4/5   Shoulder ER 4/5 Shoulder ER 4/5   Shoulder IR 4/5 Shoulder IR 4/5   Elbow flexion: 3+/5 Elbow flexion: 4+/5   Elbow extension: 3+/5 Elbow extension: 4-/5   Wrist flexion: 3+/5 Wrist flexion: 4/5   Wrist extension: 3+/5 Wrist extension: 4/5   Lower Trap 3+/5 Lower Trap 3+/5   Middle Trap 3+/5 Middle Trap 3+/5   Rhomboids 3+/5 Rhomboids 3+/5     Reflexes: normal brachioradialis and biceps DTR bilaterally    Special Tests:  Distraction -   Compression -    Spurlings + on R side    DNF test decreased endurance     Joint Mobility: unable to perform joint mobility assessment on cervical spine due to increased pain in lower back    Thoracic mobility: hypomobile upper thoracic joint segments - unremarkable with any increase in pain     Palpation: no ttp noted over cervical region      Sensation: intact to light touch    Flexibility: decreased upper trap and levator scap flexibility       CMS Impairment/Limitation/Restriction for FOTO Neck Survey    Therapist reviewed FOTO scores for Leena Christina on 10/7/2019.   FOTO documents entered into [employer] - see Media section.    Limitation Score: 48%  Category: Carrying    Current : CK = at least 40% but < 60% impaired, limited or restricted  Goal: CJ = at least 20% but < 40% impaired, limited or restricted       TREATMENT   Treatment Time In: 10:48a  Treatment Time Out: 10:58a  Total Treatment time separate from Evaluation: 10 minutes    Lucia received therapeutic exercises to develop strength, endurance, ROM, flexibility, posture and core stabilization for 10 minutes including:    Cervical AROM all directions - using towel for extension assist - only performing L sidebending due to increased pain with R sidebending   scap retracts x10   Chin tucks x10   Shoulder  horizontal abduction in supine with YTB x10   Shoulder flexion with dowel in supine x10     Home Exercises and Patient Education Provided    Education provided:   - HEP compliance     Written Home Exercises Provided: yes.  Exercises were reviewed and Lucia was able to demonstrate them prior to the end of the session.  Lucia demonstrated good  understanding of the education provided.     See EMR under Patient Instructions for exercises provided 10/7/2019.    Assessment   Leena is a 79 y.o. female referred to outpatient Physical Therapy with a medical diagnosis of cervicalgia. Pt presents after an insidious onset of neck pain in August that has since gotten better. Pt presents with signs and symptoms that include R sided neck pain and shoulder pain, decreased cervical ROM, decreased shoulder ROM, impaired joint mobility, soft tissue restrictions, and decreased tolerance to functional mobility. Pt's neck pain is increased with right rotation, extension, and sidebending bilaterally. She had positive spurling's compression test on R side and has difficulty performing sidebending to any degree. During evaluation, pt was supine, PT performed passive range of motion testing to patient's cervical spine and she had increased mid to low back pain with associated muscle spasms in her cervical region. She did not report shooting pain to indicate a positive Lhermitte's sign and indicate compression of spinal cord, but will continue to be aware of this symptom. She was unable to tolerate passive range of motion in any direction. She tends to combine active sidebending with rotation and when directed to keep her nose forward and straight she had increased pain in R side of neck. Since pt has had relief of pain since the onset, she does not wish to attend many sessions of physical therapy, but she is appropriate for therapy and would benefit from range of motion activities and postural/cervical musculature strengthening in order to  tolerate functional mobility and daily activities. Pt will be seen every other week for now and given an in-depth HEP in order to begin tolerating activities on her own.     Pt prognosis is Good.   Pt will benefit from skilled outpatient Physical Therapy to address the deficits stated above and in the chart below, provide pt/family education, and to maximize pt's level of independence.     Plan of care discussed with patient: Yes  Pt's spiritual, cultural and educational needs considered and patient is agreeable to the plan of care and goals as stated below:     Anticipated Barriers for therapy: none    Medical Necessity is demonstrated by the following  History  Co-morbidities and personal factors that may impact the plan of care Co-morbidities:   diabetes, HTN, prior abdominal surgery and peritoneal cancer, Hyperlipidemia    Personal Factors:   no deficits     high   Examination  Body Structures and Functions, activity limitations and participation restrictions that may impact the plan of care Body Regions:   neck  upper extremities  trunk    Body Systems:    ROM  strength  gross coordinated movement  balance  transfers  transitions  motor control  motor learning    Participation Restrictions:   Min to mod     Activity limitations:   Learning and applying knowledge  no deficits    General Tasks and Commands  no deficits    Communication  no deficits    Mobility  lifting and carrying objects  driving (bike, car, motorcycle)    Self care  washing oneself (bathing, drying, washing hands)    Domestic Life  shopping  cooking  doing house work (cleaning house, washing dishes, laundry)  assisting others    Interactions/Relationships  no deficits    Life Areas  no deficits    Community and Social Life  community life  recreation and leisure         moderate   Clinical Presentation stable and uncomplicated low   Decision Making/ Complexity Score: low     Goals:  Short Term GOALS: 2 weeks. Pt agrees with goals set.  1.  Patient demonstrates independence with HEP.   4. Patient will report pain of 0/10 at worst, on 0-10 pain scale, with all activity.   5. Patient demonstrates increased cervical ROM by 5 degrees in all directions to improve tolerance to functional activities pain free.   6. Patient demonstrates increased strength BUE's by 1/3 muscle grade or greater to improve tolerance to functional activities pain free.      Long Term GOALS: 4 weeks. Pt agrees with goals set.  1. Patient demonstrates increased cervical ROM by 10 degrees in all planes to improve tolerance to functional activities pain free.   2. Patient demonstrates increased strength RUE's to 1 muscle grade or greater to improve tolerance to functional activities pain free.  3. Patient demonstrates increased strength in lower trap, middle trap, and rhomboids to 4/5 or greater to improve postural stability for increased amount of time.    4. Patient demonstrates improved overall function per FOTO Neck Survey to 35% Limitation or less.   5. Patient demonstrates ability to place a 5-10# box overhead in order to improve her functional mobility throughout her home.     Plan   Plan of care Certification: 10/7/2019 to 1/7/2020.    Outpatient Physical Therapy 1 times every other week for 8 weeks to include the following interventions: Manual Therapy, Moist Heat/ Ice, Neuromuscular Re-ed, Patient Education, Self Care, Therapeutic Activites and Therapeutic Exercise.     Jennifer Wyman, PT  10/07/2019    Thank you for your referral!     I have seen the patient, reviewed the therapist's plan of care, and I agree with the plan of care.      I certify the need for these services furnished under this plan of treatment and while under my care.      ___________________ ________ Physician/Referring Practitioner             ___________________________ Date of Signature

## 2019-11-30 DIAGNOSIS — I10 ESSENTIAL HYPERTENSION: ICD-10-CM

## 2019-12-01 RX ORDER — HYDROCHLOROTHIAZIDE 25 MG/1
TABLET ORAL
Qty: 90 TABLET | Refills: 0 | Status: SHIPPED | OUTPATIENT
Start: 2019-12-01 | End: 2020-02-29

## 2020-02-09 ENCOUNTER — PATIENT MESSAGE (OUTPATIENT)
Dept: FAMILY MEDICINE | Facility: CLINIC | Age: 80
End: 2020-02-09

## 2020-02-14 DIAGNOSIS — E11.22 CONTROLLED TYPE 2 DIABETES MELLITUS WITH STAGE 3 CHRONIC KIDNEY DISEASE, WITHOUT LONG-TERM CURRENT USE OF INSULIN: ICD-10-CM

## 2020-02-14 DIAGNOSIS — N18.30 CONTROLLED TYPE 2 DIABETES MELLITUS WITH STAGE 3 CHRONIC KIDNEY DISEASE, WITHOUT LONG-TERM CURRENT USE OF INSULIN: ICD-10-CM

## 2020-02-29 DIAGNOSIS — I10 ESSENTIAL HYPERTENSION: ICD-10-CM

## 2020-02-29 RX ORDER — HYDROCHLOROTHIAZIDE 25 MG/1
TABLET ORAL
Qty: 90 TABLET | Refills: 0 | Status: SHIPPED | OUTPATIENT
Start: 2020-02-29 | End: 2020-06-01

## 2020-03-23 PROBLEM — M54.2 NECK PAIN: Status: RESOLVED | Noted: 2019-10-07 | Resolved: 2020-03-23

## 2020-03-23 PROBLEM — M25.611 DECREASED ROM OF RIGHT SHOULDER: Status: RESOLVED | Noted: 2019-10-07 | Resolved: 2020-03-23

## 2020-03-23 PROBLEM — R29.898 DECREASED ROM OF NECK: Status: RESOLVED | Noted: 2019-10-07 | Resolved: 2020-03-23

## 2020-03-23 PROBLEM — R53.1 DECREASED STRENGTH: Status: RESOLVED | Noted: 2019-10-07 | Resolved: 2020-03-23

## 2020-03-25 ENCOUNTER — DOCUMENTATION ONLY (OUTPATIENT)
Dept: REHABILITATION | Facility: HOSPITAL | Age: 80
End: 2020-03-25

## 2020-03-25 NOTE — PROGRESS NOTES
Outpatient Therapy Discharge Summary     Name: Leena Christina  Clinic Number: [id number]    Therapy Diagnosis:   Encounter Diagnoses   Name Primary?    Neck pain      Decreased ROM of neck      Decreased ROM of right shoulder      Decreased strength        Physician: Srikanth White MD     Physician Orders: PT Eval and Treat   Medical Diagnosis from Referral: Cervicalgia  Evaluation Date: 10/7/2019    Date of Last visit: 10/7/2019  Total Visits Received: 1    Assessment    Goals not met due to patient attending only initial evaluation   Short Term GOALS: 2 weeks. Pt agrees with goals set.  1. Patient demonstrates independence with HEP.   4. Patient will report pain of 0/10 at worst, on 0-10 pain scale, with all activity.   5. Patient demonstrates increased cervical ROM by 5 degrees in all directions to improve tolerance to functional activities pain free.   6. Patient demonstrates increased strength BUE's by 1/3 muscle grade or greater to improve tolerance to functional activities pain free.      Long Term GOALS: 4 weeks. Pt agrees with goals set.  1. Patient demonstrates increased cervical ROM by 10 degrees in all planes to improve tolerance to functional activities pain free.   2. Patient demonstrates increased strength RUE's to 1 muscle grade or greater to improve tolerance to functional activities pain free.  3. Patient demonstrates increased strength in lower trap, middle trap, and rhomboids to 4/5 or greater to improve postural stability for increased amount of time.    4. Patient demonstrates improved overall function per FOTO Neck Survey to 35% Limitation or less.   5. Patient demonstrates ability to place a 5-10# box overhead in order to improve her functional mobility throughout her home.     Discharge reason: Patient has not attended therapy since 10/7/2019    Plan   This patient is discharged from Physical Therapy

## 2020-04-29 RX ORDER — CELECOXIB 200 MG/1
CAPSULE ORAL
Qty: 90 CAPSULE | Refills: 0 | Status: SHIPPED | OUTPATIENT
Start: 2020-04-29

## 2020-04-29 RX ORDER — IRBESARTAN 300 MG/1
TABLET ORAL
Qty: 90 TABLET | Refills: 0 | Status: SHIPPED | OUTPATIENT
Start: 2020-04-29

## 2020-05-12 ENCOUNTER — PATIENT MESSAGE (OUTPATIENT)
Dept: ADMINISTRATIVE | Facility: HOSPITAL | Age: 80
End: 2020-05-12

## 2020-05-29 DIAGNOSIS — I10 ESSENTIAL HYPERTENSION: ICD-10-CM

## 2020-06-01 RX ORDER — HYDROCHLOROTHIAZIDE 25 MG/1
TABLET ORAL
Qty: 90 TABLET | Refills: 0 | Status: SHIPPED | OUTPATIENT
Start: 2020-06-01

## 2020-08-12 ENCOUNTER — PATIENT OUTREACH (OUTPATIENT)
Dept: ADMINISTRATIVE | Facility: HOSPITAL | Age: 80
End: 2020-08-12

## 2020-08-12 DIAGNOSIS — E11.22 CONTROLLED TYPE 2 DIABETES MELLITUS WITH STAGE 3 CHRONIC KIDNEY DISEASE, WITHOUT LONG-TERM CURRENT USE OF INSULIN: Primary | ICD-10-CM

## 2020-08-12 DIAGNOSIS — M81.0 OSTEOPOROSIS, UNSPECIFIED OSTEOPOROSIS TYPE, UNSPECIFIED PATHOLOGICAL FRACTURE PRESENCE: ICD-10-CM

## 2020-08-12 DIAGNOSIS — E78.5 HYPERLIPIDEMIA, UNSPECIFIED HYPERLIPIDEMIA TYPE: ICD-10-CM

## 2020-08-12 DIAGNOSIS — Z12.39 SCREENING FOR BREAST CANCER: ICD-10-CM

## 2020-08-12 DIAGNOSIS — N18.30 CONTROLLED TYPE 2 DIABETES MELLITUS WITH STAGE 3 CHRONIC KIDNEY DISEASE, WITHOUT LONG-TERM CURRENT USE OF INSULIN: Primary | ICD-10-CM

## 2020-08-12 DIAGNOSIS — Z12.31 ENCOUNTER FOR SCREENING MAMMOGRAM FOR MALIGNANT NEOPLASM OF BREAST: ICD-10-CM

## 2020-08-14 DIAGNOSIS — Z11.59 NEED FOR HEPATITIS C SCREENING TEST: ICD-10-CM

## 2024-05-17 NOTE — PROGRESS NOTES
50 Subjective:      Patient ID: Leena Christina is a 78 y.o. female.    Chief Complaint: Diabetes Mellitus (pcp Ehrensing ); Diabetic Foot Exam; and Nail Care    Leena is a 78 y.o. female who presents to the clinic for evaluation and treatment of high risk feet. 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 She is currently not having any major problems with feet. Has never seen a podiatrist in the past. Patient is here to have comprehensive DM foot exam and to establish care per recommendations of PCP. Routinely gets pedicures. No pain. Sometimes bottom of feet become numb but this does not bother her much. No other issues today. This patient has documented high risk feet requiring routine maintenance secondary to peripheral neuropathy.    PCP: Tayo Silveira MD    Date Last Seen by PCP:   Chief Complaint   Patient presents with    Diabetes Mellitus     pcp Ehrensing 8/29/17    Diabetic Foot Exam    Nail Care       Current shoe gear:  Affected Foot: Casual shoes        Last encounter in this department: Visit date not found    Hemoglobin A1C   Date Value Ref Range Status   04/26/2018 5.3 4.0 - 5.6 % Final     Comment:     According to ADA guidelines, hemoglobin A1c <7.0% represents  optimal control in non-pregnant diabetic patients. Different  metrics may apply to specific patient populations.   Standards of Medical Care in Diabetes-2016.  For the purpose of screening for the presence of diabetes:  <5.7%     Consistent with the absence of diabetes  5.7-6.4%  Consistent with increasing risk for diabetes    (prediabetes)  >or=6.5%  Consistent with diabetes  Currently, no consensus exists for use of hemoglobin A1c  for diagnosis of diabetes for children.  This Hemoglobin A1c assay has significant interference with fetal   hemoglobin   (HbF). The results are invalid for patients with abnormal amounts of   HbF,   including those with known Hereditary Persistence   of Fetal Hemoglobin. Heterozygous hemoglobin variants (HbAS, HbAC,   HbAD, HbAE, HbA2) do not significantly interfere with this assay;   however, presence of multiple variants in a sample may impact the %   interference.     03/17/2017 5.7 4.5 - 6.2 % Final     Comment:     According to ADA guidelines, hemoglobin A1C <7.0% represents  optimal control in non-pregnant diabetic patients.  Different  metrics may apply to specific populations.   Standards of Medical Care in Diabetes - 2016.  For the purpose of screening for the presence of diabetes:  <5.7%     Consistent with the absence of diabetes  5.7-6.4%  Consistent with increasing risk for diabetes   (prediabetes)  >or=6.5%  Consistent with diabetes  Currently no consensus exists for use of hemoglobin A1C  for diagnosis of diabetes for children.     07/10/2015 5.6 4.5 - 6.2 % Final     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    HTN (hypertension) 1/24/2013    Hyperlipidemia 1/24/2013    Insomnia 7/18/2014    Ovarian cancer 1/24/2013       Past Surgical History:   Procedure Laterality Date    BREAST CYST EXCISION      HYSTERECTOMY      OOPHORECTOMY         Family History   Problem Relation Age of Onset    Breast cancer Other        Social History     Social History    Marital status:      Spouse name: N/A    Number of children: N/A    Years of education: N/A      Social History Main Topics    Smoking status: Never Smoker    Smokeless tobacco: Never Used    Alcohol use None    Drug use: Unknown    Sexual activity: Not Asked     Other Topics Concern    None     Social History Narrative    None       Current Outpatient Prescriptions   Medication Sig Dispense Refill    aspirin (ECOTRIN) 81 MG EC tablet Take 81 mg by mouth once daily.        atorvastatin (LIPITOR) 40 MG tablet Take 1 tablet (40 mg total) by mouth once daily. 90 tablet 90    B INFANTIS/B ANI/B YARELY/B BIFID (PROBIOTIC 4X ORAL) Take by mouth.      blood sugar diagnostic Strp TEST 1 TIMES DAILY 100 strip 12    blood-glucose meter kit Use as instructed 1 each 0    calcium carbonate (OS-TAWANDA) 600 mg calcium (1,500 mg) Tab Take 600 mg by mouth 2 (two) times daily with meals.      celecoxib (CELEBREX) 200 MG capsule TAKE 1 CAPSULE EVERY OTHER DAY 90 capsule 3    ferrous sulfate 324 mg (65 mg iron) TbEC Take 325 mg by mouth once daily.      fish oil-omega-3 fatty acids 300-1,000 mg capsule Take 2 g by mouth once daily.        glucosamine-chondroitin 500-400 mg tablet Take 1 tablet by mouth 2 (two) times daily.        hydroCHLOROthiazide (HYDRODIURIL) 25 MG tablet Take 1 tablet (25 mg total) by mouth once daily. 90 tablet 3    lactobacillus combination no.4 (SENIOR PROBIOTIC ORAL) Take by mouth once daily.      lancets (FREESTYLE LANCETS) 28 gauge Misc 1 lancet by Misc.(Non-Drug; Combo Route) route 3 (three) times daily. 200 each 3    metFORMIN (GLUCOPHAGE) 500 MG tablet TAKE 1 TABLET TWICE A DAY WITH MEALS 180 tablet 2    NEXIUM 40 mg capsule TAKE 1 CAPSULE DAILY 90 capsule 11    pramipexole 0.375 mg Tb24 Take 0.375 mg by mouth once daily.      ramipril (ALTACE) 5 MG capsule TAKE 1 CAPSULE DAILY 90 capsule 0    rosuvastatin (CRESTOR) 10 MG tablet Take 10 mg by mouth once daily.      sertraline (ZOLOFT) 100 MG tablet Take 2 tablets (200 mg total) by mouth once daily. 180 tablet 12    tiZANidine  (ZANAFLEX) 4 MG tablet Take 4 mg by mouth every 6 (six) hours as needed.      valsartan (DIOVAN) 160 MG tablet Take 1 tablet (160 mg total) by mouth once daily. 90 tablet 12    ZEJULA 100 mg Cap Take 200 mg by mouth every evening.       zolpidem (AMBIEN) 5 MG Tab TAKE 1 TO 2 TABLETS BY MOUTH AT BEDTIME 45 tablet 5     No current facility-administered medications for this visit.        Review of patient's allergies indicates:   Allergen Reactions    Pyridium [phenazopyridine] Hallucinations    Dilaudid [hydromorphone] Other (See Comments)     hallucinations       Review of Systems   Constitution: Negative for chills and fever.   Cardiovascular: Positive for leg swelling. Negative for chest pain and claudication.   Respiratory: Negative for cough and shortness of breath.    Skin: Positive for dry skin. Negative for suspicious lesions.   Musculoskeletal: Negative.    Gastrointestinal: Negative for nausea and vomiting.   Neurological: Positive for numbness. Negative for paresthesias.   Psychiatric/Behavioral: Negative for altered mental status.           Objective:      Physical Exam   Constitutional: She is oriented to person, place, and time. She appears well-developed and well-nourished.   HENT:   Head: Normocephalic.   Cardiovascular: Intact distal pulses.    Pulses:       Dorsalis pedis pulses are 2+ on the right side, and 2+ on the left side.        Posterior tibial pulses are 2+ on the right side, and 2+ on the left side.   CRT < 3 sec to tips of toes. Mild edema noted to b/l LE. Mild vericosities noted to b/l LEs.      Pulmonary/Chest: No respiratory distress.   Musculoskeletal:        Right foot: There is no deformity.        Left foot: There is no deformity.   Gastrocnemius equinus noted to b/l ankles with decreased DF noted on exam. MMT 5/5 in DF/PF/Inv/Ev resistance with no reproduction of pain in any direction. Passive range of motion of ankle and pedal joints is painless. Adequate pedal joint ROM.       Feet:   Right Foot:   Protective Sensation: 10 sites tested. 10 sites sensed.   Skin Integrity: Positive for dry skin. Negative for skin breakdown or callus.   Left Foot:   Protective Sensation: 10 sites tested. 10 sites sensed.   Skin Integrity: Positive for dry skin. Negative for skin breakdown or callus.   Neurological: She is alert and oriented to person, place, and time. She has normal strength. A sensory deficit is present.   Light touch, proprioception, and sharp/dull sensation are all intact bilaterally. Protective threshold with the Burtrum-Wienstein monofilament is intact bilaterally. Vibratory sensation diminished b/l distal foot (felt for 2-3 seconds only).    Skin: Skin is warm, dry and intact. No bruising, no ecchymosis and no lesion noted. No erythema.   No open lesions, lacerations or wounds noted. Nails are normotrophic and well trimmed to R 1-5 and L 1-5. Interdigital spaces clean, dry and intact b/l. No erythema noted to b/l foot. Skin texture thin, atrophic, dry. Pedal hair diminished b/l. Toes cool to touch compared to feet which are slightly warm proximally.      Psychiatric: She has a normal mood and affect. Her behavior is normal. Judgment and thought content normal.   Vitals reviewed.            Assessment:       Encounter Diagnoses   Name Primary?    Chemotherapy-induced peripheral neuropathy Yes    Numbness of toes          Plan:       Leena was seen today for diabetes mellitus, diabetic foot exam and nail care.    Diagnoses and all orders for this visit:    Chemotherapy-induced peripheral neuropathy    Numbness of toes      I counseled the patient on her conditions, their implications and medical management.    - Shoe inspection. General Foot Education. Patient reminded of the importance of good nutrition. Patient instructed on proper foot hygeine. We discussed wearing proper shoe gear, daily foot inspections, never walking without protective shoe gear, caution putting sharp instruments  to feet     - Discussed general foot care:  Wear comfortable, proper fitting shoes. Wash feet daily. Dry well. After drying, apply moisturizer to feet (no lotion to webspaces). Inspect feet daily for skin breaks, blisters, swelling, or redness. Wear cotton socks (preferably white)  Change socks every day. Do NOT walk barefoot. Do NOT use heating pads or warm/hot water soaks     Long discussion with patient regarding appropriate, supportive and comfortable shoes. Recommended SAS/[employer] style shoe brands with adequate arch supports to alleviate abnormal pressure and improve stability of foot while walking. Avoid flat shoes and barefoot walking as these will exacerbate or worsen symptoms.     Monitor feet daily for any problems.     Pedicure precautions discussed including avoidance of cuticle lifting/cutting. Otherwise ok to get cut, filed and painted. Avoid excessive soaking.     RTC 1 year, sooner PRN